# Patient Record
Sex: MALE | Race: WHITE | Employment: FULL TIME | URBAN - METROPOLITAN AREA
[De-identification: names, ages, dates, MRNs, and addresses within clinical notes are randomized per-mention and may not be internally consistent; named-entity substitution may affect disease eponyms.]

---

## 2017-01-27 ENCOUNTER — OFFICE VISIT (OUTPATIENT)
Dept: FAMILY MEDICINE | Facility: CLINIC | Age: 32
End: 2017-01-27
Payer: COMMERCIAL

## 2017-01-27 VITALS
HEART RATE: 76 BPM | TEMPERATURE: 98.5 F | HEIGHT: 74 IN | WEIGHT: 233 LBS | SYSTOLIC BLOOD PRESSURE: 116 MMHG | BODY MASS INDEX: 29.9 KG/M2 | DIASTOLIC BLOOD PRESSURE: 72 MMHG

## 2017-01-27 DIAGNOSIS — Z23 NEED FOR PROPHYLACTIC VACCINATION AND INOCULATION AGAINST INFLUENZA: ICD-10-CM

## 2017-01-27 DIAGNOSIS — F43.22 ADJUSTMENT DISORDER WITH ANXIOUS MOOD: Primary | ICD-10-CM

## 2017-01-27 DIAGNOSIS — F41.0 PANIC ATTACK: ICD-10-CM

## 2017-01-27 PROCEDURE — 90686 IIV4 VACC NO PRSV 0.5 ML IM: CPT | Performed by: PHYSICIAN ASSISTANT

## 2017-01-27 PROCEDURE — 99203 OFFICE O/P NEW LOW 30 MIN: CPT | Mod: 25 | Performed by: PHYSICIAN ASSISTANT

## 2017-01-27 PROCEDURE — 90471 IMMUNIZATION ADMIN: CPT | Performed by: PHYSICIAN ASSISTANT

## 2017-01-27 RX ORDER — ALPRAZOLAM 0.5 MG
TABLET ORAL
Qty: 10 TABLET | Refills: 0 | Status: SHIPPED | OUTPATIENT
Start: 2017-01-27 | End: 2018-11-15

## 2017-01-27 RX ORDER — ESCITALOPRAM OXALATE 10 MG/1
10 TABLET ORAL DAILY
Qty: 30 TABLET | Refills: 1 | Status: SHIPPED | OUTPATIENT
Start: 2017-01-27 | End: 2017-03-03

## 2017-01-27 ASSESSMENT — ANXIETY QUESTIONNAIRES
6. BECOMING EASILY ANNOYED OR IRRITABLE: MORE THAN HALF THE DAYS
2. NOT BEING ABLE TO STOP OR CONTROL WORRYING: SEVERAL DAYS
GAD7 TOTAL SCORE: 10
7. FEELING AFRAID AS IF SOMETHING AWFUL MIGHT HAPPEN: MORE THAN HALF THE DAYS
1. FEELING NERVOUS, ANXIOUS, OR ON EDGE: SEVERAL DAYS
3. WORRYING TOO MUCH ABOUT DIFFERENT THINGS: MORE THAN HALF THE DAYS
5. BEING SO RESTLESS THAT IT IS HARD TO SIT STILL: NOT AT ALL

## 2017-01-27 ASSESSMENT — PATIENT HEALTH QUESTIONNAIRE - PHQ9: 5. POOR APPETITE OR OVEREATING: MORE THAN HALF THE DAYS

## 2017-01-27 NOTE — PROGRESS NOTES
Injectable Influenza Immunization Documentation    1.  Is the person to be vaccinated sick today? Yes    2. Does the person to be vaccinated have an allergy to eggs or to a component of the vaccine?  No    3. Has the person to be vaccinated today ever had a serious reaction to influenza vaccine in the past?  No    4. Has the person to be vaccinated ever had Guillain-[place] syndrome?  No     Form completed by Chely Win, Certified Medical Assistant (AAMA)

## 2017-01-27 NOTE — PROGRESS NOTES
SUBJECTIVE:                                                    Rex Cummins is a 32 year old male who presents to clinic today for the following health issues:      Abnormal Mood Symptoms     Onset: A few months     Description:   Depression: YES  Anxiety: YES    Accompanying Signs & Symptoms:  Still participating in activities that you used to enjoy: YES- but hasn't been fishing as much as normal, also had to leave a [employer] convention due to anxiety.  Fatigue: not sure, has an infant with sleeping issues.  Does have a hard time falling asleep.  Irritability: YES  Difficulty concentrating: YES- depends on situation, hard time at home.  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 Drinking a bit more, though he's cut down on that. Admits that new child is making this happen. He is doing well but overwhelmed with the changes. His wife also has struggled with depressive type symptoms recently and their relationship has been strained. He's been trying to get a bit more exercise and make some personal changes. He did have 2 episodes of panic attack type symptoms recently, the most recent at a [employer] show where he became panicked and needed to leave, had chest pain and severe anxiety which he says resolved when he left.    He has some low mood issues but those are more related he thinks to and secondary to his anxiety.     Patient Active Problem List   Diagnosis     Tobacco use disorder, continuous     CARDIOVASCULAR  "SCREENING; LDL GOAL LESS THAN 160      Current Outpatient Prescriptions   Medication     ALPRAZolam (XANAX) 0.5 MG tablet     escitalopram (LEXAPRO) 10 MG tablet     No current facility-administered medications for this visit.        Problem list and histories reviewed & adjusted, as indicated.  Additional history: as documented    Problem list, Medication list, Allergies, and Medical/Social/Surgical histories reviewed in [facility] and updated as appropriate.    ROS:  Constitutional, HEENT, cardiovascular, pulmonary, gi and gu systems are negative, except as otherwise noted.    OBJECTIVE:                                                    /72 mmHg  Pulse 76  Temp(Src) 98.5  F (36.9  C) (Oral)  Ht 6' 2\" (1.88 m)  Wt 233 lb (105.688 kg)  BMI 29.90 kg/m2  Body mass index is 29.9 kg/(m^2).  GENERAL: healthy, alert and no distress  Psych: Appropriate appearance.  Alert and oriented times 3; coherent speech, normal   rate and volume, able to articulate logical thoughts, able   to abstract reason, no tangential thoughts, no hallucinations   or delusions.  Normal behavior.  His affect is bright.           ASSESSMENT/PLAN:                                                        ICD-10-CM    1. Adjustment disorder with anxious mood F43.22 MENTAL HEALTH REFERRAL     escitalopram (LEXAPRO) 10 MG tablet   2. Panic attack F41.0 ALPRAZolam (XANAX) 0.5 MG tablet     escitalopram (LEXAPRO) 10 MG tablet   3. Need for prophylactic vaccination and inoculation against influenza Z23 FLU VAC, SPLIT VIRUS IM > 3 YO (QUADRIVALENT) [93400]     Vaccine Administration, Initial [76927]      Reviewed options. Recommend therapy. Recommend exercise, self cares, meditation. Will refer for therapy. Start Lexapro. Okay to have Alprazolam around for panic attacks if used rarely. This prescription is given with a discussion of side effects, risks and proper use.  Instructions are given to follow up if not improving or symptoms change or worsen as " discussed.     25 min visit over 50% counseling.  Follow up in 3 weeks phone visit, sooner if issues.     NATHALIE Haines, PA-C

## 2017-01-27 NOTE — PATIENT INSTRUCTIONS
To Consider seeing therapist Mikael Matias - Call: [phone]  Exercise whenever you can get it is always good   Takes 3-4 weeks for Lexapro to start working     [facility]   Discharged by : Chely BENTLEY Certified Medical Assistant (AAMA)   Paper scripts provided to patient : 1   If you have any questions regarding to your visit please contact your care team:   [facility] Hours Telephone Number   DEEP Jeffers Dr., PA-C    7am-7pm Monday - Thursday   7am-5pm Fridays  [phone]   (Appointment scheduling available 24/7)   RN Line   [phone] option 2       What options do I have for visits at the clinic other than the traditional office visit?   To expand how we care for you, many of our providers are utilizing electronic visits (e-visits) and telephone visits, when medically appropriate, for interactions with their patients rather than a visit in the clinic. We also offer nurse visits for many medical concerns. Just like any other service, we will bill your insurance company for this type of visit based on time spent on the phone with your provider. Not all insurance companies cover these visits. Please check with your medical insurance if this type of visit is covered. You will be responsible for any charges that are not paid by your insurance.     E-visits via [employer]: generally incur a $35.00 fee.     Telephone visits:   Time spent on the phone: *charged based on time that is spent on the phone in increments of 10 minutes. Estimated cost:   5-10 mins $30.00   11-20 mins. $59.00   21-30 mins. $85.00   Use [employer] (secure email communication and access to your chart) to send your primary care provider a message or make an appointment. Ask someone on your Team how to sign up for [employer].   For a Price Quote for your services, please call our Consumer Price Line at [phone].   As always, Thank you for trusting us with your health  care needs!                [facility]:    Scheduling Appointments  [facility]  Call: [phone]    [place] [facility]  Call: [phone]    [facility]  Call: [phone]

## 2017-01-27 NOTE — NURSING NOTE
"Chief Complaint   Patient presents with     Anxiety     Mental Health Problem     Flu Shot       Initial /72 mmHg  Pulse 76  Temp(Src) 98.5  F (36.9  C) (Oral)  Ht 6' 2\" (1.88 m)  Wt 233 lb (105.688 kg)  BMI 29.90 kg/m2 Estimated body mass index is 29.9 kg/(m^2) as calculated from the following:    Height as of this encounter: 6' 2\" (1.88 m).    Weight as of this encounter: 233 lb (105.688 kg).  BP completed using cuff size: annette Win, Certified Medical Assistant (AAMA)     "

## 2017-01-27 NOTE — MR AVS SNAPSHOT
After Visit Summary   1/27/2017    Rex Cummins    MRN: [id number]           Patient Information     Date Of Birth          1985        Visit Information        Provider Department      1/27/2017 2:00 PM Ham García PA-C [facility]        Today's Diagnoses     Adjustment disorder with anxious mood    -  1     Panic attack         Need for prophylactic vaccination and inoculation against influenza           Care Instructions    To Consider seeing therapist Mikael Matias - Call: [phone]  Exercise whenever you can get it is always good   Takes 3-4 weeks for Lexapro to start working     [facility]   Discharged by : Chely BENTLEY Certified Medical Assistant (AAMA)   Paper scripts provided to patient : 1   If you have any questions regarding to your visit please contact your care team:   [facility] Hours Telephone Number   DEEP Jeffers Dr., PA-C    7am-7pm Monday - Thursday   7am-5pm Fridays  [phone]   (Appointment scheduling available 24/7)   RN Line   [phone] option 2       What options do I have for visits at the clinic other than the traditional office visit?   To expand how we care for you, many of our providers are utilizing electronic visits (e-visits) and telephone visits, when medically appropriate, for interactions with their patients rather than a visit in the clinic. We also offer nurse visits for many medical concerns. Just like any other service, we will bill your insurance company for this type of visit based on time spent on the phone with your provider. Not all insurance companies cover these visits. Please check with your medical insurance if this type of visit is covered. You will be responsible for any charges that are not paid by your insurance.     E-visits via [employer]: generally incur a $35.00 fee.     Telephone visits:   Time spent on the phone: *charged based  on time that is spent on the phone in increments of 10 minutes. Estimated cost:   5-10 mins $30.00   11-20 mins. $59.00   21-30 mins. $85.00   Use [employer] (secure email communication and access to your chart) to send your primary care provider a message or make an appointment. Ask someone on your Team how to sign up for [employer].   For a Price Quote for your services, please call our [place] Price Line at [phone].   As always, Thank you for trusting us with your health care needs!                [facility]:    Scheduling Appointments  [facility]  Call: [phone]    [facility]  Call: [phone]    [facility]  Call: [phone]                  Follow-ups after your visit        Additional Services     MENTAL HEALTH REFERRAL       Your provider has referred you to: [facility]: [facility] - Counseling (Individual/Couples/Family) - Pt to schedule with Mikael Matias [facility]     All scheduling is subject to the client's specific insurance plan & benefits, provider/location availability, and provider clinical specialities.  Please arrive 15 minutes early for your first appointment and bring your completed paperwork.    Please be aware that coverage of these services is subject to the terms and limitations of your health insurance plan.  Call member services at your health plan with any benefit or coverage questions.                  Who to contact     If you have questions or need follow up information about today's clinic visit or your schedule please contact [facility] directly at [phone].  Normal or non-critical lab and imaging results will be communicated to you by MyChart, letter or phone within 4 business days after the clinic has received the results. If you do not hear from us within 7 days, please contact the clinic through MyChart or phone. If you have a critical or  "abnormal lab result, we will notify you by phone as soon as possible.  Submit refill requests through [employer] or call your pharmacy and they will forward the refill request to us. Please allow 3 business days for your refill to be completed.          Additional Information About Your Visit        [email or web address] Information     Next Points lets you send messages to your doctor, view your test results, renew your prescriptions, schedule appointments and more. To sign up, go to [email or web address] . Click on \"Log in\" on the left side of the screen, which will take you to the Welcome page. Then click on \"Sign up Now\" on the right side of the page.     You will be asked to enter the access code listed below, as well as some personal information. Please follow the directions to create your username and password.     Your access code is: [email or web address]  Expires: 2017  2:57 PM     Your access code will  in 90 days. If you need help or a new code, please call your [place] clinic or [phone].        Care EveryWhere ID     This is your Care EveryWhere ID. This could be used by other organizations to access your [place] medical records  [id number]        Your Vitals Were     Pulse Temperature Height BMI (Body Mass Index)          76 98.5  F (36.9  C) (Oral) 6' 2\" (1.88 m) 29.90 kg/m2         Blood Pressure from Last 3 Encounters:   17 116/72   11 131/73   03/08/10 126/73    Weight from Last 3 Encounters:   17 233 lb (105.688 kg)   11 209 lb 9.6 oz (95.074 kg)   03/08/10 217 lb (98.431 kg)              We Performed the Following     FLU VAC, SPLIT VIRUS IM > 3 YO (QUADRIVALENT) [91664]     MENTAL HEALTH REFERRAL     Vaccine Administration, Initial [46124]          Today's Medication Changes          These changes are accurate as of: 17  2:57 PM.  If you have any questions, ask your nurse or doctor.               Start taking these medicines.        Dose/Directions    ALPRAZolam 0.5 MG " tablet   Commonly known as:  XANAX   Used for:  Panic attack   Started by:  Ham García PA-C        Take 1 or 2 tablets as needed for panic attack   Quantity:  10 tablet   Refills:  0       escitalopram 10 MG tablet   Commonly known as:  LEXAPRO   Used for:  Adjustment disorder with anxious mood, Panic attack   Started by:  Ham García PA-C        Dose:  10 mg   Take 1 tablet (10 mg) by mouth daily   Quantity:  30 tablet   Refills:  1            Where to get your medicines      These medications were sent to [facility] [address] [place], [place] - [address] [facility]  [address] [facility] [address]     Phone:  [phone]    - escitalopram 10 MG tablet      Some of these will need a paper prescription and others can be bought over the counter.  Ask your nurse if you have questions.     Bring a paper prescription for each of these medications    - ALPRAZolam 0.5 MG tablet             Primary Care Provider Office Phone # Fax #    Torie Hines -[phone]       [address] [address]        Thank you!     Thank you for choosing [facility]  for your care. Our goal is always to provide you with excellent care. Hearing back from our patients is one way we can continue to improve our services. Please take a few minutes to complete the written survey that you may receive in the mail after your visit with us. Thank you!             Your Updated Medication List - Protect others around you: Learn how to safely use, store and throw away your medicines at www.disposemymeds.org.          This list is accurate as of: 1/27/17  2:57 PM.  Always use your most recent med list.                   Brand Name Dispense Instructions for use    ALPRAZolam 0.5 MG tablet    XANAX    10 tablet    Take 1 or 2 tablets as needed for panic attack       escitalopram 10 MG tablet    LEXAPRO    30 tablet    Take 1 tablet (10 mg) by mouth  daily

## 2017-01-28 ASSESSMENT — ANXIETY QUESTIONNAIRES: GAD7 TOTAL SCORE: 10

## 2017-01-28 ASSESSMENT — PATIENT HEALTH QUESTIONNAIRE - PHQ9: SUM OF ALL RESPONSES TO PHQ QUESTIONS 1-9: 10

## 2017-03-03 ENCOUNTER — OFFICE VISIT (OUTPATIENT)
Dept: FAMILY MEDICINE | Facility: CLINIC | Age: 32
End: 2017-03-03
Payer: COMMERCIAL

## 2017-03-03 VITALS
WEIGHT: 227.2 LBS | SYSTOLIC BLOOD PRESSURE: 112 MMHG | BODY MASS INDEX: 29.17 KG/M2 | TEMPERATURE: 98.4 F | DIASTOLIC BLOOD PRESSURE: 64 MMHG | HEART RATE: 80 BPM

## 2017-03-03 DIAGNOSIS — F41.0 PANIC ATTACK: ICD-10-CM

## 2017-03-03 DIAGNOSIS — F43.22 ADJUSTMENT DISORDER WITH ANXIOUS MOOD: ICD-10-CM

## 2017-03-03 DIAGNOSIS — F41.1 GAD (GENERALIZED ANXIETY DISORDER): Primary | ICD-10-CM

## 2017-03-03 PROCEDURE — 99213 OFFICE O/P EST LOW 20 MIN: CPT | Performed by: PHYSICIAN ASSISTANT

## 2017-03-03 RX ORDER — ESCITALOPRAM OXALATE 10 MG/1
TABLET ORAL
Qty: 90 TABLET | Refills: 1 | Status: SHIPPED | OUTPATIENT
Start: 2017-03-03 | End: 2017-08-09 | Stop reason: DRUGHIGH

## 2017-03-03 ASSESSMENT — ANXIETY QUESTIONNAIRES
5. BEING SO RESTLESS THAT IT IS HARD TO SIT STILL: SEVERAL DAYS
3. WORRYING TOO MUCH ABOUT DIFFERENT THINGS: NOT AT ALL
GAD7 TOTAL SCORE: 2
6. BECOMING EASILY ANNOYED OR IRRITABLE: NOT AT ALL
2. NOT BEING ABLE TO STOP OR CONTROL WORRYING: NOT AT ALL
7. FEELING AFRAID AS IF SOMETHING AWFUL MIGHT HAPPEN: NOT AT ALL
1. FEELING NERVOUS, ANXIOUS, OR ON EDGE: SEVERAL DAYS

## 2017-03-03 ASSESSMENT — PATIENT HEALTH QUESTIONNAIRE - PHQ9: 5. POOR APPETITE OR OVEREATING: NOT AT ALL

## 2017-03-03 NOTE — NURSING NOTE
"Chief Complaint   Patient presents with     Anxiety     Recheck       Initial /64  Pulse 80  Temp 98.4  F (36.9  C) (Oral)  Wt 227 lb 3.2 oz (103.1 kg)  BMI 29.17 kg/m2 Estimated body mass index is 29.17 kg/(m^2) as calculated from the following:    Height as of 1/27/17: 6' 2\" (1.88 m).    Weight as of this encounter: 227 lb 3.2 oz (103.1 kg).  Medication Reconciliation: complete     Hope SHANTAL Persaud      "

## 2017-03-03 NOTE — PROGRESS NOTES
SUBJECTIVE:                                                    Rex Cummins is a 32 year old male who presents to clinic today for the following health issues:      Anxiety Follow-Up    Status since last visit: Stable    Other associated symptoms:None    Complicating factors:   Significant life event: No   Current substance abuse: None  Depression symptoms: No  VALERIA-7 SCORE 1/27/2017   Total Score 10        GAD7     Doing well. Lexapro is well tolerated since starting and has improved mood / anxiety, etc. He denies any concerns of side effects or major panic attacks currently.    Problem list and histories reviewed & adjusted, as indicated.  Additional history: as documented    Labs reviewed in EPIC    Reviewed and updated as needed this visit by clinical staff  Tobacco  Allergies  Med Hx  Surg Hx  Fam Hx  Soc Hx      Reviewed and updated as needed this visit by Provider         ROS:  Constitutional, HEENT, cardiovascular, pulmonary, gi and gu systems are negative, except as otherwise noted.    OBJECTIVE:                                                    /64  Pulse 80  Temp 98.4  F (36.9  C) (Oral)  Wt 227 lb 3.2 oz (103.1 kg)  BMI 29.17 kg/m2  Body mass index is 29.17 kg/(m^2).  GENERAL: healthy, alert and no distress  Psych: Appropriate appearance.  Alert and oriented times 3; coherent speech, normal   rate and volume, able to articulate logical thoughts, able   to abstract reason, no tangential thoughts, no hallucinations   or delusions.  Normal behavior.  His affect is bright.         ASSESSMENT/PLAN:                                                      (F41.1) VALERIA (generalized anxiety disorder)  (primary encounter diagnosis)  Comment:   Plan: Generalized anxiety - stable. Recommend to continue the Lexapro. This prescription is given with a discussion of side effects, risks and proper use.  Instructions are given to follow up if not improving or symptoms change or worsen as discussed.      (F43.22) Adjustment disorder with anxious mood  Comment:   Plan: escitalopram (LEXAPRO) 10 MG tablet        Improving     (F41.0) Panic attack  Comment:   Plan: escitalopram (LEXAPRO) 10 MG tablet        Improving     Follow up in 3-6 months, sooner if issues.     JOSE LAURENT PA-C  [facility]

## 2017-03-03 NOTE — MR AVS SNAPSHOT
"              After Visit Summary   3/3/2017    Rex Cummins    MRN: [id number]           Patient Information     Date Of Birth          1985        Visit Information        Provider Department      3/3/2017 2:40 PM Ham García PA-C [facility]        Today's Diagnoses     VALERIA (generalized anxiety disorder)    -  1    Adjustment disorder with anxious mood        Panic attack           Follow-ups after your visit        Your next 10 appointments already scheduled     Mar 27, 2017  1:30 PM CDT   New Visit with ARI Cleary   [facility]    [address] [facility] [address] [facility] [address] [phone]              Who to contact     If you have questions or need follow up information about today's clinic visit or your schedule please contact [facility] directly at [phone].  Normal or non-critical lab and imaging results will be communicated to you by MyChart, letter or phone within 4 business days after the clinic has received the results. If you do not hear from us within 7 days, please contact the clinic through MyChart or phone. If you have a critical or abnormal lab result, we will notify you by phone as soon as possible.  Submit refill requests through [employer] or call your pharmacy and they will forward the refill request to us. Please allow 3 business days for your refill to be completed.          Additional Information About Your Visit        MyChart Information     [employer] lets you send messages to your doctor, view your test results, renew your prescriptions, schedule appointments and more. To sign up, go to [email or web address] . Click on \"Log in\" on the left side of the screen, which will take you to the Welcome page. Then click on \"Sign up Now\" on the right side of the page.     You will be asked to enter the access code listed below, as well as some personal information. " Please follow the directions to create your username and password.     Your access code is: [email or web address]  Expires: 2017  2:57 PM     Your access code will  in 90 days. If you need help or a new code, please call your [place] clinic or [phone].        Care EveryWhere ID     This is your Care EveryWhere ID. This could be used by other organizations to access your [place] medical records  [id number]        Your Vitals Were     Pulse Temperature BMI (Body Mass Index)             80 98.4  F (36.9  C) (Oral) 29.17 kg/m2          Blood Pressure from Last 3 Encounters:   17 112/64   17 116/72   11 131/73    Weight from Last 3 Encounters:   17 227 lb 3.2 oz (103.1 kg)   17 233 lb (105.7 kg)   11 209 lb 9.6 oz (95.1 kg)              Today, you had the following     No orders found for display         Today's Medication Changes          These changes are accurate as of: 3/3/17  3:08 PM.  If you have any questions, ask your nurse or doctor.               These medicines have changed or have updated prescriptions.        Dose/Directions    escitalopram 10 MG tablet   Commonly known as:  LEXAPRO   This may have changed:    - how much to take  - how to take this  - when to take this  - additional instructions   Used for:  Adjustment disorder with anxious mood, Panic attack   Changed by:  Ham García PA-C        1 tablet daily (Profile med - please keep on file for patient to call)   Quantity:  90 tablet   Refills:  1            Where to get your medicines      These medications were sent to [facility] [address] [facility] [address] [place] [address]  [address] [facility] [address]     Phone:  [phone]     escitalopram 10 MG tablet                Primary Care Provider Office Phone # Fax #    Torie Hines [phone]       [address] [address]        Thank you!     Thank you for choosing  [facility]  for your care. Our goal is always to provide you with excellent care. Hearing back from our patients is one way we can continue to improve our services. Please take a few minutes to complete the written survey that you may receive in the mail after your visit with us. Thank you!             Your Updated Medication List - Protect others around you: Learn how to safely use, store and throw away your medicines at www.disposemymeds.org.          This list is accurate as of: 3/3/17  3:08 PM.  Always use your most recent med list.                   Brand Name Dispense Instructions for use    ALPRAZolam 0.5 MG tablet    XANAX    10 tablet    Take 1 or 2 tablets as needed for panic attack       escitalopram 10 MG tablet    LEXAPRO    90 tablet    1 tablet daily (Profile med - please keep on file for patient to call)

## 2017-03-04 ASSESSMENT — PATIENT HEALTH QUESTIONNAIRE - PHQ9: SUM OF ALL RESPONSES TO PHQ QUESTIONS 1-9: 2

## 2017-03-04 ASSESSMENT — ANXIETY QUESTIONNAIRES: GAD7 TOTAL SCORE: 2

## 2017-08-02 ENCOUNTER — MYC MEDICAL ADVICE (OUTPATIENT)
Dept: FAMILY MEDICINE | Facility: CLINIC | Age: 32
End: 2017-08-02

## 2017-08-02 DIAGNOSIS — F41.1 GAD (GENERALIZED ANXIETY DISORDER): Primary | ICD-10-CM

## 2017-08-02 DIAGNOSIS — F41.0 PANIC ATTACK: ICD-10-CM

## 2017-08-03 PROBLEM — F41.1 GAD (GENERALIZED ANXIETY DISORDER): Status: ACTIVE | Noted: 2017-08-03

## 2017-08-03 RX ORDER — ESCITALOPRAM OXALATE 20 MG/1
20 TABLET ORAL DAILY
Qty: 90 TABLET | Refills: 0 | Status: SHIPPED | OUTPATIENT
Start: 2017-08-03 | End: 2017-11-06

## 2017-11-06 DIAGNOSIS — F41.0 PANIC ATTACK: ICD-10-CM

## 2017-11-06 DIAGNOSIS — F41.1 GAD (GENERALIZED ANXIETY DISORDER): ICD-10-CM

## 2017-11-06 NOTE — LETTER
[address] [address] [phone]    November 9, 2017      Rex Cummins  [address] [place] [id number]      Dear Rex,    We recently received a call from your pharmacy requesting a refill of your medication, Lexapro/escitalopram.     A review of your chart indicates that an appointment is required with your provider.  Please call the clinic at [phone] to schedule your appointment.    We have authorized one refill of your medication to allow time for you to schedule.   If you have a history of diabetes or high cholesterol, please come in fasting for the appointment. Fasting entails nothing to eat or drink 8 hours prior to your appointment; with the exception on water. You may take your medication the day of the appointment.    Thank you,      Patient Care staff for Ham García PA-C

## 2017-11-09 RX ORDER — ESCITALOPRAM OXALATE 20 MG/1
TABLET ORAL
Qty: 30 TABLET | Refills: 0 | Status: SHIPPED | OUTPATIENT
Start: 2017-11-09 | End: 2018-11-15

## 2018-11-15 ENCOUNTER — OFFICE VISIT (OUTPATIENT)
Dept: FAMILY MEDICINE | Facility: CLINIC | Age: 33
End: 2018-11-15
Payer: COMMERCIAL

## 2018-11-15 VITALS
HEIGHT: 74 IN | SYSTOLIC BLOOD PRESSURE: 124 MMHG | DIASTOLIC BLOOD PRESSURE: 76 MMHG | BODY MASS INDEX: 31.95 KG/M2 | HEART RATE: 68 BPM | WEIGHT: 249 LBS | TEMPERATURE: 98 F

## 2018-11-15 DIAGNOSIS — Z23 NEED FOR PROPHYLACTIC VACCINATION AND INOCULATION AGAINST INFLUENZA: ICD-10-CM

## 2018-11-15 DIAGNOSIS — Z23 NEED FOR PROPHYLACTIC VACCINATION WITH TETANUS-DIPHTHERIA (TD): ICD-10-CM

## 2018-11-15 DIAGNOSIS — Z23 FLU VACCINE NEED: ICD-10-CM

## 2018-11-15 DIAGNOSIS — Z00.00 ROUTINE GENERAL MEDICAL EXAMINATION AT A HEALTH CARE FACILITY: Primary | ICD-10-CM

## 2018-11-15 PROBLEM — F41.1 GAD (GENERALIZED ANXIETY DISORDER): Status: RESOLVED | Noted: 2017-08-03 | Resolved: 2018-11-15

## 2018-11-15 PROBLEM — Z86.59 HISTORY OF ANXIETY: Status: ACTIVE | Noted: 2018-11-15

## 2018-11-15 PROCEDURE — 36415 COLL VENOUS BLD VENIPUNCTURE: CPT | Performed by: PHYSICIAN ASSISTANT

## 2018-11-15 PROCEDURE — 99395 PREV VISIT EST AGE 18-39: CPT | Mod: 25 | Performed by: PHYSICIAN ASSISTANT

## 2018-11-15 PROCEDURE — 90686 IIV4 VACC NO PRSV 0.5 ML IM: CPT | Performed by: PHYSICIAN ASSISTANT

## 2018-11-15 PROCEDURE — 82947 ASSAY GLUCOSE BLOOD QUANT: CPT | Performed by: PHYSICIAN ASSISTANT

## 2018-11-15 PROCEDURE — 90471 IMMUNIZATION ADMIN: CPT | Performed by: PHYSICIAN ASSISTANT

## 2018-11-15 PROCEDURE — 80061 LIPID PANEL: CPT | Performed by: PHYSICIAN ASSISTANT

## 2018-11-15 ASSESSMENT — ENCOUNTER SYMPTOMS
NERVOUS/ANXIOUS: 0
NAUSEA: 0
DIARRHEA: 0
WEAKNESS: 0
COUGH: 0
CONSTIPATION: 0
HEMATOCHEZIA: 0
PARESTHESIAS: 0
DIZZINESS: 0
HEARTBURN: 0
HEADACHES: 0
MYALGIAS: 0
ABDOMINAL PAIN: 0
ARTHRALGIAS: 0
FREQUENCY: 0
HEMATURIA: 0
JOINT SWELLING: 0
PALPITATIONS: 0
CHILLS: 0
EYE PAIN: 0
DYSURIA: 0
SORE THROAT: 0
SHORTNESS OF BREATH: 0
FEVER: 0

## 2018-11-15 NOTE — PROGRESS NOTES

## 2018-11-15 NOTE — PROGRESS NOTES
SUBJECTIVE:   CC: Rex Cummins is an 33 year old male who presents for preventative health visit.     Physical   Annual:     Getting at least 3 servings of Calcium per day:  NO    Bi-annual eye exam:  Yes    Dental care twice a year:  NO    Sleep apnea or symptoms of sleep apnea:  Daytime drowsiness    Diet:  Regular (no restrictions) and Breakfast skipped    Frequency of exercise:  1 day/week    Duration of exercise:  15-30 minutes    Taking medications regularly:  Yes    Medication side effects:  Not applicable    Additional concerns today:  No      Today's PHQ-2 Score:   PHQ-2 ( 1999 Pfizer) 11/15/2018   Q1: Little interest or pleasure in doing things 0   Q2: Feeling down, depressed or hopeless 0   PHQ-2 Score 0   Q1: Little interest or pleasure in doing things Not at all   Q2: Feeling down, depressed or hopeless Not at all   PHQ-2 Score 0       Abuse: Current or Past(Physical, Sexual or Emotional)- No  Do you feel safe in your environment - Yes    Social History   Substance Use Topics     Smoking status: Former Smoker     Types: Cigarettes, Dip, chew, snus or snuff     Smokeless tobacco: Never Used     Alcohol use Yes      Comment: Cutting down - socially, a few drinks here and there      Alcohol Use 11/15/2018   If you drink alcohol do you typically have greater than 3 drinks per day OR greater than 7 drinks per week? No   No flowsheet data found.    Last PSA: No results found for: PSA    Reviewed orders with patient. Reviewed health maintenance and updated orders accordingly - Yes  Labs reviewed in EPIC    Reviewed and updated as needed this visit by clinical staff  Tobacco  Allergies  Meds  Med Hx  Surg Hx  Fam Hx  Soc Hx        Reviewed and updated as needed this visit by Provider  Allergies            Review of Systems   Constitutional: Negative for chills and fever.   HENT: Negative for congestion, ear pain, hearing loss and sore throat.    Eyes: Negative for pain and visual disturbance.  "  Respiratory: Negative for cough and shortness of breath.    Cardiovascular: Negative for chest pain, palpitations and peripheral edema.   Gastrointestinal: Negative for abdominal pain, constipation, diarrhea, heartburn, hematochezia and nausea.   Genitourinary: Negative for discharge, dysuria, frequency, genital sores, hematuria, impotence and urgency.   Musculoskeletal: Negative for arthralgias, joint swelling and myalgias.   Skin: Negative for rash.   Neurological: Negative for dizziness, weakness, headaches and paresthesias.   Psychiatric/Behavioral: Negative for mood changes. The patient is not nervous/anxious.      OBJECTIVE:   /76 (Cuff Size: Adult Large)  Pulse 68  Temp 98  F (36.7  C) (Oral)  Ht 6' 2\" (1.88 m)  Wt 249 lb (112.9 kg)  BMI 31.97 kg/m2    Physical Exam  GENERAL: healthy, alert and no distress  EYES: Eyes grossly normal to inspection, PERRL and conjunctivae and sclerae normal  HENT: ear canals and TM's normal, nose and mouth without ulcers or lesions  NECK: no adenopathy, no asymmetry, masses, or scars and thyroid normal to palpation  RESP: lungs clear to auscultation - no rales, rhonchi or wheezes  CV: regular rate and rhythm, normal S1 S2, no S3 or S4, no murmur, click or rub, no peripheral edema and peripheral pulses strong  ABDOMEN: soft, nontender, no hepatosplenomegaly, no masses and bowel sounds normal  MS: no gross musculoskeletal defects noted, no edema  SKIN: no suspicious lesions or rashes  NEURO: Normal strength and tone, mentation intact and speech normal  PSYCH: mentation appears normal, affect normal/bright  LYMPH: no cervical, supraclavicular, axillary, or inguinal adenopathy    Diagnostic Test Results:  none     ASSESSMENT/PLAN:   (Z00.00) Routine general medical examination at a health care facility  (primary encounter diagnosis)  Comment: Well person   Plan: Lipid panel reflex to direct LDL Non-fasting,         Glucose        Diet, exercise, wellness and other " "preventive recommendations related to health maintenance were discussed.  Follow up as needed for acute issues.  Physical exam in 1 year.     (Z23) Need for prophylactic vaccination with tetanus-diphtheria (Td)  Comment:   Plan: Is considering     (Z23) Flu vaccine need  Comment:   Plan:     (Z23) Need for prophylactic vaccination and inoculation against influenza  Comment:   Plan: Vaccine Administration, Initial [85021], FLU         VACCINE, SPLIT VIRUS, IM (QUADRIVALENT)         [61641]- >3 YRS        Given.       COUNSELING:   Reviewed preventive health counseling, as reflected in patient instructions    BP Readings from Last 1 Encounters:   11/15/18 124/76     Estimated body mass index is 31.97 kg/(m^2) as calculated from the following:    Height as of this encounter: 6' 2\" (1.88 m).    Weight as of this encounter: 249 lb (112.9 kg).     reports that he has quit smoking. His smoking use included Cigarettes and Dip, chew, snus or snuff. He has never used smokeless tobacco.      Counseling Resources:  ATP IV Guidelines  Pooled Cohorts Equation Calculator  FRAX Risk Assessment  ICSI Preventive Guidelines  Dietary Guidelines for Americans, 2010  USDA's MyPlate  ASA Prophylaxis  Lung CA Screening    JOSE LAURENT PA-C  [facility]  Answers for HPI/ROS submitted by the patient on 11/15/2018   PHQ-2 Score: 0    "

## 2018-11-15 NOTE — MR AVS SNAPSHOT
After Visit Summary   11/15/2018    Rex Cummins    MRN: [id number]           Patient Information     Date Of Birth          1985        Visit Information        Provider Department      11/15/2018 2:20 PM Ham García PA-C [facility]        Today's Diagnoses     Routine general medical examination at a health care facility    -  1    Need for prophylactic vaccination with tetanus-diphtheria (Td)        Flu vaccine need          Care Instructions      Preventive Health Recommendations  Male Ages 26 - 39    Yearly exam:             See your health care provider every year in order to  o   Review health changes.   o   Discuss preventive care.    o   Review your medicines if your doctor has prescribed any.    You should be tested each year for STDs (sexually transmitted diseases), if you re at risk.     After age 35, talk to your provider about cholesterol testing. If you are at risk for heart disease, have your cholesterol tested at least every 5 years.     If you are at risk for diabetes, you should have a diabetes test (fasting glucose).  Shots: Get a flu shot each year. Get a tetanus shot every 10 years.     Nutrition:    Eat at least 5 servings of fruits and vegetables daily.     Eat whole-grain bread, whole-wheat pasta and brown rice instead of white grains and rice.     Get adequate Calcium and Vitamin D.     Lifestyle    Exercise for at least 150 minutes a week (30 minutes a day, 5 days a week). This will help you control your weight and prevent disease.     Limit alcohol to one drink per day.     No smoking.     Wear sunscreen to prevent skin cancer.     See your dentist every six months for an exam and cleaning.             Follow-ups after your visit        Who to contact     If you have questions or need follow up information about today's clinic visit or your schedule please contact [facility] directly at [phone].  Normal or  "non-critical lab and imaging results will be communicated to you by MyChart, letter or phone within 4 business days after the clinic has received the results. If you do not hear from us within 7 days, please contact the clinic through [employer] or phone. If you have a critical or abnormal lab result, we will notify you by phone as soon as possible.  Submit refill requests through [employer] or call your pharmacy and they will forward the refill request to us. Please allow 3 business days for your refill to be completed.          Additional Information About Your Visit        [employer] Information     [employer] gives you secure access to your electronic health record. If you see a primary care provider, you can also send messages to your care team and make appointments. If you have questions, please call your primary care clinic.  If you do not have a primary care provider, please call [phone] and they will assist you.        Care EveryWhere ID     This is your Care EveryWhere ID. This could be used by other organizations to access your [place] medical records  [id number]        Your Vitals Were     Pulse Temperature Height BMI (Body Mass Index)          68 98  F (36.7  C) (Oral) 6' 2\" (1.88 m) 31.97 kg/m2         Blood Pressure from Last 3 Encounters:   11/15/18 124/76   03/03/17 112/64   01/27/17 116/72    Weight from Last 3 Encounters:   11/15/18 249 lb (112.9 kg)   03/03/17 227 lb 3.2 oz (103.1 kg)   01/27/17 233 lb (105.7 kg)              We Performed the Following     Glucose     Lipid panel reflex to direct LDL Non-fasting          Today's Medication Changes          These changes are accurate as of 11/15/18  2:51 PM.  If you have any questions, ask your nurse or doctor.               Stop taking these medicines if you haven't already. Please contact your care team if you have questions.     ALPRAZolam 0.5 MG tablet   Commonly known as:  XANAX   Stopped by:  Ham García PA-C           escitalopram 20 " MG tablet   Commonly known as:  LEXAPRO   Stopped by:  Ham García PA-C                    Primary Care Provider Office Phone # Fax #    Torie Hines [phone]       [address] [address]        Equal Access to Services     [facility]: Hadii aad ku hadasho Soomaali, waaxda luqadaha, qaybta kaalmada adeegyada, waxay idiin hayaan adeeg kharash la'aan . So [facility] [phone].    ATENCIÓN: Si habla español, tiene a tolentino disposición servicios gratuitos de asistencia lingüística. [facility] [phone].    We comply with applicable federal civil rights laws and [place] laws. We do not discriminate on the basis of race, color, national origin, age, disability, sex, sexual orientation, or gender identity.            Thank you!     Thank you for choosing [facility]  for your care. Our goal is always to provide you with excellent care. Hearing back from our patients is one way we can continue to improve our services. Please take a few minutes to complete the written survey that you may receive in the mail after your visit with us. Thank you!             Your Updated Medication List - Protect others around you: Learn how to safely use, store and throw away your medicines at www.disposemymeds.org.      Notice  As of 11/15/2018  2:51 PM    You have not been prescribed any medications.

## 2018-11-15 NOTE — NURSING NOTE
Prior to injection verified patient identity using patient's name and date of birth.  Due to injection administration, patient instructed to remain in clinic for 15 minutes  afterwards, and to report any adverse reaction to me immediately.    Shruti Giles CMA ([facility])

## 2018-11-16 LAB
CHOLEST SERPL-MCNC: 199 MG/DL
GLUCOSE SERPL-MCNC: 80 MG/DL (ref 70–99)
HDLC SERPL-MCNC: 32 MG/DL
LDLC SERPL CALC-MCNC: 105 MG/DL
NONHDLC SERPL-MCNC: 167 MG/DL
TRIGL SERPL-MCNC: 310 MG/DL

## 2018-11-23 NOTE — NURSING NOTE
Wellness Screening form completed, copy made for chart, and faxed to [employer], [phone].    Thanks!  Sami Sotelo

## 2019-12-18 ENCOUNTER — OFFICE VISIT (OUTPATIENT)
Dept: FAMILY MEDICINE | Facility: CLINIC | Age: 34
End: 2019-12-18
Payer: COMMERCIAL

## 2019-12-18 VITALS
TEMPERATURE: 97.9 F | SYSTOLIC BLOOD PRESSURE: 132 MMHG | HEART RATE: 78 BPM | WEIGHT: 247 LBS | HEIGHT: 74 IN | DIASTOLIC BLOOD PRESSURE: 88 MMHG | BODY MASS INDEX: 31.7 KG/M2 | OXYGEN SATURATION: 98 %

## 2019-12-18 DIAGNOSIS — L71.9 ROSACEA: ICD-10-CM

## 2019-12-18 DIAGNOSIS — L70.0 CYSTIC ACNE VULGARIS: ICD-10-CM

## 2019-12-18 DIAGNOSIS — Z23 NEED FOR PROPHYLACTIC VACCINATION AND INOCULATION AGAINST INFLUENZA: ICD-10-CM

## 2019-12-18 DIAGNOSIS — Z00.00 ROUTINE GENERAL MEDICAL EXAMINATION AT A HEALTH CARE FACILITY: Primary | ICD-10-CM

## 2019-12-18 DIAGNOSIS — E78.1 HYPERTRIGLYCERIDEMIA: ICD-10-CM

## 2019-12-18 LAB
CHOLEST SERPL-MCNC: 208 MG/DL
GLUCOSE BLD-MCNC: 86 MG/DL (ref 70–99)
HDLC SERPL-MCNC: 48 MG/DL
LDLC SERPL CALC-MCNC: 134 MG/DL
NONHDLC SERPL-MCNC: 160 MG/DL
TRIGL SERPL-MCNC: 128 MG/DL

## 2019-12-18 PROCEDURE — 36415 COLL VENOUS BLD VENIPUNCTURE: CPT | Performed by: NURSE PRACTITIONER

## 2019-12-18 PROCEDURE — 99395 PREV VISIT EST AGE 18-39: CPT | Mod: 25 | Performed by: NURSE PRACTITIONER

## 2019-12-18 PROCEDURE — 82947 ASSAY GLUCOSE BLOOD QUANT: CPT | Performed by: NURSE PRACTITIONER

## 2019-12-18 PROCEDURE — 87389 HIV-1 AG W/HIV-1&-2 AB AG IA: CPT | Performed by: NURSE PRACTITIONER

## 2019-12-18 PROCEDURE — 90472 IMMUNIZATION ADMIN EACH ADD: CPT | Performed by: NURSE PRACTITIONER

## 2019-12-18 PROCEDURE — 90471 IMMUNIZATION ADMIN: CPT | Performed by: NURSE PRACTITIONER

## 2019-12-18 PROCEDURE — 90686 IIV4 VACC NO PRSV 0.5 ML IM: CPT | Performed by: NURSE PRACTITIONER

## 2019-12-18 PROCEDURE — 99213 OFFICE O/P EST LOW 20 MIN: CPT | Mod: 25 | Performed by: NURSE PRACTITIONER

## 2019-12-18 PROCEDURE — 90715 TDAP VACCINE 7 YRS/> IM: CPT | Performed by: NURSE PRACTITIONER

## 2019-12-18 PROCEDURE — 80061 LIPID PANEL: CPT | Performed by: NURSE PRACTITIONER

## 2019-12-18 RX ORDER — CLINDAMYCIN PHOSPHATE AND BENZOYL PEROXIDE 10; 50 MG/G; MG/G
GEL TOPICAL
Qty: 45 G | Refills: 0 | Status: SHIPPED | OUTPATIENT
Start: 2019-12-18

## 2019-12-18 RX ORDER — METRONIDAZOLE 10 MG/G
GEL TOPICAL DAILY
Qty: 60 G | Refills: 0 | Status: SHIPPED | OUTPATIENT
Start: 2019-12-18

## 2019-12-18 ASSESSMENT — ENCOUNTER SYMPTOMS
DIZZINESS: 0
CHILLS: 0
COUGH: 0
SORE THROAT: 0
MYALGIAS: 0
JOINT SWELLING: 0
DIARRHEA: 0
NAUSEA: 0
HEARTBURN: 0
HEMATURIA: 0
ARTHRALGIAS: 0
ABDOMINAL PAIN: 0
EYE PAIN: 0
CONSTIPATION: 0
PALPITATIONS: 0
HEMATOCHEZIA: 0
HEADACHES: 0
FREQUENCY: 0
DYSURIA: 0
NERVOUS/ANXIOUS: 0
PARESTHESIAS: 0
WEAKNESS: 0
FEVER: 0
SHORTNESS OF BREATH: 0

## 2019-12-18 ASSESSMENT — MIFFLIN-ST. JEOR: SCORE: 2140.13

## 2019-12-18 NOTE — PROGRESS NOTES
SUBJECTIVE:   CC: Rex Cummins is an 34 year old male who presents for preventative health visit.     Healthy Habits:     Getting at least 3 servings of Calcium per day:  NO    Bi-annual eye exam:  Yes    Dental care twice a year:  Yes    Sleep apnea or symptoms of sleep apnea:  None    Diet:  Regular (no restrictions) and Breakfast skipped    Frequency of exercise:  2-3 days/week    Duration of exercise:  15-30 minutes    Taking medications regularly:  Not Applicable    Medication side effects:  Not applicable    PHQ-2 Total Score: 0    Additional concerns today:  No    Rosacea  Erythematic areas around his cheeks and nose.  No formal diagnosis    Cystic acne  Ongoing has been using over-the-counter topical medication without much effect.  Washes his face with Cetaphil.    * biometric form needs to be completed for work.    Hypertriglyceridemia  Elevated when last checked says he drinks between 2-3 alcoholic beverages a night.  Monitor his diet for salt.    Today's PHQ-2 Score:   PHQ-2 ( 1999 Pfizer) 12/18/2019   Q1: Little interest or pleasure in doing things 0   Q2: Feeling down, depressed or hopeless 0   PHQ-2 Score 0   Q1: Little interest or pleasure in doing things Not at all   Q2: Feeling down, depressed or hopeless Not at all   PHQ-2 Score 0       Abuse: Current or Past(Physical, Sexual or Emotional)- No  Do you feel safe in your environment? Yes        Social History     Tobacco Use     Smoking status: Former Smoker     Types: Cigarettes, Dip, chew, snus or snuff     Smokeless tobacco: Never Used   Substance Use Topics     Alcohol use: Yes     Comment: Cutting down - socially, a few drinks here and there      If you drink alcohol do you typically have >3 drinks per day or >7 drinks per week? Yes      Alcohol Use 12/18/2019   Prescreen: >3 drinks/day or >7 drinks/week? Yes   Prescreen: >3 drinks/day or >7 drinks/week? -   AUDIT SCORE  6     AUDIT - Alcohol Use Disorders Identification Test - Reproduced  from the World Health Organization Audit 2001 (Second Edition) 12/18/2019   1.  How often do you have a drink containing alcohol? 4 or more times a week   2.  How many drinks containing alcohol do you have on a typical day when you are drinking? 1 or 2   3.  How often do you have five or more drinks on one occasion? Monthly   4.  How often during the last year have you found that you were not able to stop drinking once you had started? Never   5.  How often during the last year have you failed to do what was normally expected of you because of drinking? Never   6.  How often during the last year have you needed a first drink in the morning to get yourself going after a heavy drinking session? Never   7.  How often during the last year have you had a feeling of guilt or remorse after drinking? Never   8.  How often during the last year have you been unable to remember what happened the night before because of your drinking? Never   9.  Have you or someone else been injured because of your drinking? No   10. Has a relative, friend, doctor or other health care worker been concerned about your drinking or suggested you cut down? No   TOTAL SCORE 6       Last PSA: No results found for: PSA    Reviewed orders with patient. Reviewed health maintenance and updated orders accordingly - Yes  Lab work is in process    Reviewed and updated as needed this visit by clinical staff  Tobacco  Allergies  Meds         Reviewed and updated as needed this visit by Provider            Review of Systems   Constitutional: Negative for chills and fever.   HENT: Positive for congestion. Negative for ear pain, hearing loss and sore throat.    Eyes: Negative for pain and visual disturbance.   Respiratory: Negative for cough and shortness of breath.    Cardiovascular: Negative for chest pain, palpitations and peripheral edema.   Gastrointestinal: Negative for abdominal pain, constipation, diarrhea, heartburn, hematochezia and nausea.  "  Genitourinary: Negative for discharge, dysuria, frequency, genital sores, hematuria, impotence and urgency.   Musculoskeletal: Negative for arthralgias, joint swelling and myalgias.   Skin: Negative for rash.   Neurological: Negative for dizziness, weakness, headaches and paresthesias.   Psychiatric/Behavioral: Negative for mood changes. The patient is not nervous/anxious.      CONSTITUTIONAL: NEGATIVE for fever, chills, change in weight  INTEGUMENTARY/SKIN: NEGATIVE for worrisome rashes, moles or lesions  EYES: NEGATIVE for vision changes or irritation  ENT: NEGATIVE for ear, mouth and throat problems  RESP: NEGATIVE for significant cough or SOB  CV: NEGATIVE for chest pain, palpitations or peripheral edema  GI: NEGATIVE for nausea, abdominal pain, heartburn, or change in bowel habits   male: negative for dysuria, hematuria, decreased urinary stream, erectile dysfunction, urethral discharge  MUSCULOSKELETAL: NEGATIVE for significant arthralgias or myalgia  NEURO: NEGATIVE for weakness, dizziness or paresthesias  PSYCHIATRIC: NEGATIVE for changes in mood or affect    OBJECTIVE:   /88   Pulse 78   Temp 97.9  F (36.6  C) (Oral)   Ht 1.88 m (6' 2\")   Wt 112 kg (247 lb)   SpO2 98%   BMI 31.71 kg/m      Physical Exam  GENERAL: healthy, alert and no distress  EYES: Eyes grossly normal to inspection, PERRL and conjunctivae and sclerae normal  HENT: ear canals and TM's normal, nose and mouth without ulcers or lesions  NECK: no adenopathy, no asymmetry, masses, or scars and thyroid normal to palpation  RESP: lungs clear to auscultation - no rales, rhonchi or wheezes  CV: regular rate and rhythm, normal S1 S2, no S3 or S4, no murmur, click or rub, no peripheral edema and peripheral pulses strong  ABDOMEN: soft, nontender, no hepatosplenomegaly, no masses and bowel sounds normal  MS: no gross musculoskeletal defects noted, no edema  SKIN: Areas of erythema around cheeks and nose and cystic papules around cheeks " "forehead.  nEURO: Normal strength and tone, mentation intact and speech normal  PSYCH: mentation appears normal, affect normal/bright    Diagnostic Test Results:  Labs reviewed in Epic  No results found for this or any previous visit (from the past 24 hour(s)).    ASSESSMENT/PLAN:   1. Routine general medical examination at a health care facility  Preventive visit today with concerns addressed below  - HIV Screening  - TDAP VACCINE (ADACEL)  - Glucose, whole blood    2. Hypertriglyceridemia  Recommend limiting alcohol intake avoiding saturated fats recheck today  - Lipid panel reflex to direct LDL Fasting    3. Rosacea  Presentation consistent with rosacea  - metroNIDAZOLE (METROGEL) 1 % external gel; Apply topically daily  Dispense: 60 g; Refill: 0    4. Cystic acne vulgaris  Presentation consistent with cystic acne recommend spot treatment and using Cetaphil face wash twice daily avoid touching hands to face  - clindamycin phos-benzoyl perox (DUAC) 1.2-5 % external gel; Apply pea-sized amount to affected area once to twice daily.  Dispense: 45 g; Refill: 0    COUNSELING:   Reviewed preventive health counseling, as reflected in patient instructions       Regular exercise       Healthy diet/nutrition       Vision screening       Decrease alcohol intake    Estimated body mass index is 31.71 kg/m  as calculated from the following:    Height as of this encounter: 1.88 m (6' 2\").    Weight as of this encounter: 112 kg (247 lb).     Weight management plan: Discussed healthy diet and exercise guidelines     reports that he has quit smoking. His smoking use included cigarettes and dip, chew, snus or snuff. He has never used smokeless tobacco.      Counseling Resources:  ATP IV Guidelines  Pooled Cohorts Equation Calculator  FRAX Risk Assessment  ICSI Preventive Guidelines  Dietary Guidelines for Americans, 2010  USDA's MyPlate  ASA Prophylaxis  Lung CA Screening    LIANA [facility] JULI

## 2019-12-19 LAB — HIV 1+2 AB+HIV1 P24 AG SERPL QL IA: NONREACTIVE

## 2019-12-24 ENCOUNTER — TELEPHONE (OUTPATIENT)
Dept: URGENT CARE | Facility: URGENT CARE | Age: 34
End: 2019-12-24

## 2019-12-24 NOTE — TELEPHONE ENCOUNTER
Forms received from [employer]/ Wellness Screening Form  for Joyce Gilliam CNP.  Forms placed in provider 'sign me' folder.  Please fax forms to [phone] after completion.    Renetta Muñoz  Patient Representative

## 2020-07-08 ENCOUNTER — VIRTUAL VISIT (OUTPATIENT)
Dept: FAMILY MEDICINE | Facility: OTHER | Age: 35
End: 2020-07-08

## 2020-07-08 ENCOUNTER — NURSE TRIAGE (OUTPATIENT)
Dept: NURSING | Facility: CLINIC | Age: 35
End: 2020-07-08

## 2020-07-09 NOTE — PROGRESS NOTES
"Date: 2020 22:46:36  Clinician: Jeffrey Martinez  Clinician NPI: [id number]  Patient: Rex Cummins  Patient : 1985  Patient Address: [address] [place] [id number]  Patient Phone: [phone]  Visit Protocol: URI  Patient Summary:  Rex is a 35 year old ( : 1985 ) male who initiated a Visit for COVID-19 (Coronavirus) evaluation and screening. When asked the question \"Please sign me up to receive news, health information and promotions. \", Rex responded \"No\".    Rex states his symptoms started 1-2 days ago.   His symptoms consist of tooth pain, diarrhea, rhinitis, facial pain or pressure, and nasal congestion. He is experiencing difficulty breathing due to nasal congestion but he is not short of breath.   Symptom details     Nasal secretions: The color of his mucus is white, green, and yellow.    Facial pain or pressure: The facial pain or pressure feels worse when bending over or leaning forward.     Tooth pain: The tooth pain is not caused by a cavity, recent dental work, or other mouth problems.      Rex denies having wheezing, nausea, ageusia, vomiting, malaise, ear pain, headache, chills, sore throat, myalgias, anosmia, fever, and cough. He also denies having recent facial or sinus surgery in the past 60 days, taking antibiotic medication in the past month, and having a sinus infection within the past year.    Pertinent COVID-19 (Coronavirus) information  In the past 14 days, Rex has not worked in a congregate living setting.   He does not work or volunteer as healthcare worker or a  and does not work or volunteer in a healthcare facility.   Rex also has not lived in a congregate living setting in the past 14 days. He does not live with a healthcare worker.   Rex has had a close contact with a laboratory-confirmed COVID-19 patient within 14 days of symptom onset. Additional information about contact with COVID-19 (Coronavirus) patient " as reported by the patient (free text): See prior apt notes   Pertinent medical history  Rex does not need a return to work/school note.   Weight: 245 lbs   Rex smokes or uses smokeless tobacco.   Weight: 245 lbs  Reason for repeat visit for the same protocol within 24 hours:  Please complete my summary. It only states that I need to do visits for my family.  See the History of referred by protocol and completed visits section for details on previous visits (visits currently in queue to be diagnosed will not appear in this section).    MEDICATIONS: No current medications, ALLERGIES: Penicillins  Clinician Response:  Dear Rex Goodman,  Your wife and child EACH need to be enrolled in [facility] so we can generate a record and an order for them to be tested. You now of a record of your symptoms. Call [phone] and schedule YOUR testing. Once the others have gone through the [facility] process like you, we can order their testing.  JEFFREY Martinez MD    Diagnosis: Cough  Diagnosis ICD: R05

## 2020-07-09 NOTE — PROGRESS NOTES
"Date: 2020 23:05:17  Clinician: Monique Felix  Clinician NPI: [id number]  Patient: Rex Cummins  Patient : 1985  Patient Address: [address] [place] [address]  Patient Phone: [phone]  Visit Protocol: URI  Patient Summary:  Rex is a 35 year old ( : 1985 ) male who initiated a Visit for COVID-19 (Coronavirus) evaluation and screening. When asked the question \"Please sign me up to receive news, health information and promotions. \", Rex responded \"No\".    Rex states his symptoms started 1-2 days ago.   His symptoms consist of tooth pain, diarrhea, rhinitis, facial pain or pressure, and nasal congestion. He is experiencing difficulty breathing due to nasal congestion but he is not short of breath.   Symptom details     Nasal secretions: The color of his mucus is white, green, and yellow.    Facial pain or pressure: The facial pain or pressure feels worse when bending over or leaning forward.     Tooth pain: The tooth pain is not caused by a cavity, recent dental work, or other mouth problems.      Rex denies having wheezing, nausea, ageusia, vomiting, malaise, ear pain, headache, chills, sore throat, myalgias, anosmia, fever, and cough. He also denies having recent facial or sinus surgery in the past 60 days, taking antibiotic medication in the past month, and having a sinus infection within the past year.    Pertinent COVID-19 (Coronavirus) information  In the past 14 days, Rex has not worked in a congregate living setting.   He does not work or volunteer as healthcare worker or a  and does not work or volunteer in a healthcare facility.   Rex also has not lived in a congregate living setting in the past 14 days. He does not live with a healthcare worker.   Rex has had a close contact with a laboratory-confirmed COVID-19 patient within 14 days of symptom onset. Additional information about contact with COVID-19 (Coronavirus) patient " as reported by the patient (free text): See prior visit   Pertinent medical history  Rex does not need a return to work/school note.   Weight: 245 lbs   Rex smokes or uses smokeless tobacco.   Weight: 245 lbs  Reason for repeat visit for the same protocol within 24 hours:  Please complete my Order so I can schedule all three of us for a drive up test. My wife and Son have orders from Jeffrey Martinez MD. Visit ID:[id number] and ID:[id number]. When I called to make an apt I did not have an order attached to my visit ID.  See the History of referred by protocol and completed visits section for details on previous visits (visits currently in queue to be diagnosed will not appear in this section).    MEDICATIONS: No current medications, ALLERGIES: Penicillins  Clinician Response:  Dear Rex,  I am sorry you are not feeling well. Your health is our priority. Based on the information you have provided, it is possible that you may have some type of viral infection.  Please read the full treatment plan and see my recommendations below.  Medication information  Because you have a viral infection, antibiotics will not help you get better. Treating a viral infection with antibiotics could actually make you feel worse.  Self care  Steps you can take to be as comfortable as possible:     Rest.    Drink plenty of fluids.    Take a warm shower to loosen congestion    Use a cool-mist humidifier.     Also, as your provider, I need you to know that becoming tobacco-free is the most important thing you can do to protect your current and future health.  Additional treatment plan   Your symptoms show that you may have coronavirus (COVID-19). This illness can cause fever, cough and trouble breathing. Many people get a mild case and get better on their own. Some people can get very sick.  What should I do?  We would like to test you for this virus.   1. Please call [phone] to schedule your visit. Explain that you were referred by  "[facility] to have a COVID-19 test. Be ready to share your [facility] visit ID number.  The following will serve as your written order for this COVID Test, ordered by me, for the indication of suspected COVID [Z20.828]: The test will be ordered in [facility], our electronic health record, after you are scheduled. It will show as ordered and authorized by Ayden Soto MD.  Order: COVID-19 (Coronavirus) PCR for SYMPTOMATIC testing from [facility].      2. When it's time for your COVID test:  Stay at least 6 feet away from others. (If someone will drive you to your test, stay in the backseat, as far away from the  as you can.)   Cover your mouth and nose with a mask, tissue or washcloth.  Go straight to the testing site. Don't make any stops on the way there or back.      3.Starting now: Stay home and away from others (self-isolate) until:   You've had no fever---and no medicine that reduces fever---for 3 full days (72 hours). And...   Your other symptoms have gotten better. For example, your cough or breathing has improved. And...   At least 10 days have passed since your symptoms started.       During this time, don't leave the house except for testing or medical care.   Stay in your own room, even for meals. Use your own bathroom if you can.   Stay away from others in your home. No hugging, kissing or shaking hands. No visitors.  Don't go to work, school or anywhere else.    Clean \"high touch\" surfaces often (doorknobs, counters, handles, etc.). Use a household cleaning spray or wipes. You'll find a full list of  on the EPA website: www.epa.gov/pesticide-registration/list-n-disinfectants-use-against-sars-cov-2.   Cover your mouth and nose with a mask, tissue or washcloth to avoid spreading germs.  Wash your hands and face often. Use soap and water.  Caregivers in these groups are at risk for severe illness due to COVID-19:  o People 65 years and older  o People who live in a nursing home or long-term care facility  o " People with chronic disease (lung, heart, cancer, diabetes, kidney, liver, immunologic)  o People who have a weakened immune system, including those who:   Are in cancer treatment  Take medicine that weakens the immune system, such as corticosteroids  Had a bone marrow or organ transplant  Have an immune deficiency  Have poorly controlled HIV or AIDS  Are obese (body mass index of 40 or higher)  Smoke regularly   o Caregivers should wear gloves while washing dishes, handling laundry and cleaning bedrooms and bathrooms.  o Use caution when washing and drying laundry: Don't shake dirty laundry, and use the warmest water setting that you can.  o For more tips, go to www.cdc.gov/coronavirus/2019-ncov/downloads/10Things.pdf.    4.Sign up for [employer]. We know it's scary to hear that you might have COVID-19. We want to track your symptoms to make sure you're okay over the next 2 weeks. Please look for an email from [employer]---this is a free, online program that we'll use to keep in touch. To sign up, follow the link in the email. Learn more at [email or web address]  How can I take care of myself?   Get lots of rest. Drink extra fluids (unless a doctor has told you not to).   Take Tylenol (acetaminophen) for fever or pain. If you have liver or kidney problems, ask your family doctor if it's okay to take Tylenol.   Adults can take either:    650 mg (two 325 mg pills) every 4 to 6 hours, or...   1,000 mg (two 500 mg pills) every 8 hours as needed.    Note: Don't take more than 3,000 mg in one day. Acetaminophen is found in many medicines (both prescribed and over-the-counter medicines). Read all labels to be sure you don't take too much.   For children, check the Tylenol bottle for the right dose. The dose is based on the child's age or weight.    If you have other health problems (like cancer, heart failure, an organ transplant or severe kidney disease): Call your specialty clinic if you don't feel  better in the next 2 days.       Know when to call 911. Emergency warning signs include:    Trouble breathing or shortness of breath Pain or pressure in the chest that doesn't go away Feeling confused like you haven't felt before, or not being able to wake up Bluish-colored lips or face.  Where can I get more information?   [facility] -- About COVID-19: [email or web address]   CDC -- What to Do If You're Sick: www.cdc.gov/coronavirus/2019-ncov/about/steps-when-sick.html   CDC -- Ending Home Isolation: www.cdc.gov/coronavirus/2019-ncov/hcp/disposition-in-home-patients.html   CDC -- Caring for Someone: www.cdc.gov/coronavirus/2019-ncov/if-you-are-sick/care-for-someone.html   [facility] -- Interim Guidance for Hospital Discharge to Home: www.[facility].[place]/diseases/coronavirus/hcp/hospdischarge.pdf   [facility] clinical trials (COVID-19 research studies): clinicalaffairs.[facility]-clinical-trials    Below are the COVID-19 hotlines at the [facility] ([facility]). Interpreters are available.    For health questions: Call 134-203-9451 or 1-806.407.7851 (7 a.m. to 7 p.m.) For questions about schools and childcare: Call 048-885-6306 or 1-859.398.2453 (7 a.m. to 7 p.m.)    Diagnosis: Nasal congestion  Diagnosis ICD: R09.81

## 2020-07-09 NOTE — PROGRESS NOTES
"Date: 2020 21:10:24  Clinician: Jeffrey Martinez  Clinician NPI: [id number]  Patient: Rex Cummins  Patient : 1985  Patient Address: [address] [place] [address]  Patient Phone: [phone]  Visit Protocol: URI  Patient Summary:  Rex is a 35 year old ( : 1985 ) male who initiated a Visit for COVID-19 (Coronavirus) evaluation and screening. When asked the question \"Please sign me up to receive news, health information and promotions. \", Rex responded \"No\".    Rex states his symptoms started 1-2 days ago.   His symptoms consist of tooth pain, diarrhea, rhinitis, facial pain or pressure, and nasal congestion. He is experiencing difficulty breathing due to nasal congestion but he is not short of breath.   Symptom details     Nasal secretions: The color of his mucus is white, green, and yellow.    Facial pain or pressure: The facial pain or pressure feels worse when bending over or leaning forward.     Tooth pain: The tooth pain is not caused by a cavity, recent dental work, or other mouth problems.      Rex denies having wheezing, nausea, ageusia, vomiting, malaise, ear pain, headache, chills, sore throat, myalgias, anosmia, fever, and cough. He also denies having recent facial or sinus surgery in the past 60 days, taking antibiotic medication in the past month, and having a sinus infection within the past year.    Pertinent COVID-19 (Coronavirus) information  In the past 14 days, Rex has not worked in a congregate living setting.   He does not work or volunteer as healthcare worker or a  and does not work or volunteer in a healthcare facility.   Rex also has not lived in a congregate living setting in the past 14 days. He does not live with a healthcare worker.   Rex has had a close contact with a laboratory-confirmed COVID-19 patient within 14 days of symptom onset. Additional information about contact with COVID-19 (Coronavirus) patient " as reported by the patient (free text): Was exposed at 4th of July gathering, Friend starting feeling ill after a couple hours and left. He was tested on 7/5 and was confirmed positive 7/7. We were in close proximity indoors and outdoors.   Pertinent medical history  Rex does not need a return to work/school note.   Weight: 245 lbs   Rex smokes or uses smokeless tobacco.   Additional information as reported by the patient (free text): I would like to get my Wife and child tested as well. We all had the same level of exposure.  My Son is showing similar symptoms to me.   Weight: 245 lbs    MEDICATIONS: No current medications, ALLERGIES: Penicillins  Clinician Response:  Dear Rex Goodman,  Enroll each relative on this [facility] so we can generate a record and an order. JEFFREY Martniez MD    Diagnosis: Cough  Diagnosis ICD: R05

## 2020-07-09 NOTE — TELEPHONE ENCOUNTER
Patient calling says he did [facility] visits this evening for himself, his wife and child asking for COVID19 testing.  He says he got the response and orders for wife and child but not himself.   [facility] help line given to patient.    Brianna Jones RN  Triage Nurse Advisor

## 2020-07-10 DIAGNOSIS — Z20.822 SUSPECTED COVID-19 VIRUS INFECTION: Primary | ICD-10-CM

## 2020-07-10 PROCEDURE — U0003 INFECTIOUS AGENT DETECTION BY NUCLEIC ACID (DNA OR RNA); SEVERE ACUTE RESPIRATORY SYNDROME CORONAVIRUS 2 (SARS-COV-2) (CORONAVIRUS DISEASE [COVID-19]), AMPLIFIED PROBE TECHNIQUE, MAKING USE OF HIGH THROUGHPUT TECHNOLOGIES AS DESCRIBED BY CMS-2020-01-R: HCPCS | Performed by: FAMILY MEDICINE

## 2020-07-10 NOTE — LETTER
July 16, 2020        Rex LOPEZ [place] [address]  [address] [address]    This letter provides a written record that you were tested for COVID-19 on 07/10/2020.       Your result was negative. This means that we didn t find the virus that causes COVID-19 in your sample. A test may show negative when you do actually have the virus. This can happen when the virus is in the early stages of infection, before you feel illness symptoms.    If you have symptoms   Stay home and away from others (self-isolate) until you meet ALL of the guidelines below:    You ve had no fever--and no medicine that reduces fever--for 3 full days (72 hours). And      Your other symptoms have gotten better. For example, your cough or breathing has improved. And     At least 10 days have passed since your symptoms started.    During this time:    Stay home. Don t go to work, school or anywhere else.     Stay in your own room, including for meals. Use your own bathroom if you can.    Stay away from others in your home. No hugging, kissing or shaking hands. No visitors.    Clean  high touch  surfaces often (doorknobs, counters, handles, etc.). Use a household cleaning spray or wipes. You can find a full list on the EPA website at www.epa.gov/pesticide-registration/list-n-disinfectants-use-against-sars-cov-2.    Cover your mouth and nose with a mask, tissue or washcloth to avoid spreading germs.    Wash your hands and face often with soap and water.    Going back to work  Check with your employer for any guidelines to follow for going back to work.    Employers: This document serves as formal notice that your employee tested negative for COVID-19, as of the testing date shown above.

## 2020-07-11 LAB
SARS-COV-2 RNA SPEC QL NAA+PROBE: NOT DETECTED
SPECIMEN SOURCE: NORMAL

## 2020-11-02 ENCOUNTER — OFFICE VISIT (OUTPATIENT)
Dept: FAMILY MEDICINE | Facility: CLINIC | Age: 35
End: 2020-11-02
Payer: COMMERCIAL

## 2020-11-02 VITALS
OXYGEN SATURATION: 98 % | BODY MASS INDEX: 30.06 KG/M2 | WEIGHT: 234.25 LBS | SYSTOLIC BLOOD PRESSURE: 121 MMHG | HEART RATE: 75 BPM | HEIGHT: 74 IN | DIASTOLIC BLOOD PRESSURE: 82 MMHG | TEMPERATURE: 98.3 F

## 2020-11-02 DIAGNOSIS — M54.42 ACUTE LEFT-SIDED LOW BACK PAIN WITH LEFT-SIDED SCIATICA: ICD-10-CM

## 2020-11-02 DIAGNOSIS — M54.16 LUMBAR RADICULOPATHY: Primary | ICD-10-CM

## 2020-11-02 PROCEDURE — 99214 OFFICE O/P EST MOD 30 MIN: CPT | Performed by: PHYSICIAN ASSISTANT

## 2020-11-02 RX ORDER — PREDNISONE 20 MG/1
40 TABLET ORAL DAILY
Qty: 10 TABLET | Refills: 0 | Status: SHIPPED | OUTPATIENT
Start: 2020-11-02 | End: 2020-11-07

## 2020-11-02 ASSESSMENT — MIFFLIN-ST. JEOR: SCORE: 2069.36

## 2020-11-02 NOTE — PROGRESS NOTES
"Subjective     Rex Cummins is a 35 year old male who presents to clinic today for the following health issues:    HPI         Back Pain  Onset/Duration: 10/24/20  Description:   Location of pain: low back left  Character of pain: sharp  Pain radiation: radiates into the left leg  New numbness or weakness in legs, not attributed to pain: no   Intensity: Currently 3-41/10, At its worst 10/10  Progression of Symptoms: worsening and intermittent  History:   Specific cause: lifting  Pain interferes with job: YES  History of back problems: no prior back problems  Any previous MRI or X-rays: None  Sees a specialist for back pain: No  Alleviating factors:   Improved by: IBU and walking  Precipitating factors:  Worsened by: Lifting, Bending, Sitting, Lying Flat, Coughing/Sneezing, Stairs and twisting  Therapies tried and outcome: Ibu helps for a bit    Lifting boat cover, as he was lifting he slipped on the ice. Caught himself. That night was pretty painful. Has slowly been improving - now more of a pinching pain.   Radiates down left leg to shin.     for a medical device company. Discomfort at work, doesn't necessarily prevent him from performing work duties.    Accompanying Signs & Symptoms:  Risk of Fracture: None  Risk of Cauda Equina: None  Risk of Infection: None  Risk of Cancer: None  Risk of Ankylosing Spondylitis: Onset at age <35, male, AND morning back stiffness  no       Review of Systems   Constitutional, HEENT, cardiovascular, pulmonary, gi and gu systems are negative, except as otherwise noted.      Objective    /82 (BP Location: Right arm, Patient Position: Sitting, Cuff Size: Adult Large)   Pulse 75   Temp 98.3  F (36.8  C) (Oral)   Ht 1.867 m (6' 1.5\")   Wt 106.3 kg (234 lb 4 oz)   SpO2 98%   BMI 30.49 kg/m    Body mass index is 30.49 kg/m .  Physical Exam   GENERAL: healthy, alert and no distress  NECK: no adenopathy, no asymmetry, masses, or scars and thyroid normal to " "palpation  RESP: lungs clear to auscultation - no rales, rhonchi or wheezes  CV: regular rate and rhythm, normal S1 S2, no S3 or S4, no murmur, click or rub, no peripheral edema and peripheral pulses strong  ABDOMEN: soft, nontender, no hepatosplenomegaly, no masses and bowel sounds normal  MS: no gross musculoskeletal defects noted, no edema  BACK: left sided lumbar paraspinal muscle spasm, pain with extension. Positive left straight leg raise. Forward flexion within normal limits.            Assessment & Plan     Lumbar radiculopathy  Acute left-sided low back pain with left-sided sciatica  Acute low back pain should improve significantly with prednisone and physical therapy. Advised he avoid long periods of immobility as well. Can use OTC NSAIDs after prednisone if not improving. No xray indicated as plan of care will not change. No MRI indicated as plan of care will not change. If symptoms are not improving with 6+ weeks of physical therapy, will consider imaging. Patient is in agreement and has no further questions.   - CAN PT, HAND, AND CHIROPRACTIC REFERRAL; Future  - predniSONE (DELTASONE) 20 MG tablet; Take 2 tablets (40 mg) by mouth daily for 5 days       BMI:   Estimated body mass index is 30.49 kg/m  as calculated from the following:    Height as of this encounter: 1.867 m (6' 1.5\").    Weight as of this encounter: 106.3 kg (234 lb 4 oz).          Return if symptoms worsen or fail to improve.    Yvonne Lara PA-C  [facility]    "

## 2020-11-09 ENCOUNTER — THERAPY VISIT (OUTPATIENT)
Dept: PHYSICAL THERAPY | Facility: CLINIC | Age: 35
End: 2020-11-09
Attending: PHYSICIAN ASSISTANT
Payer: COMMERCIAL

## 2020-11-09 DIAGNOSIS — M54.16 LUMBAR RADICULOPATHY: ICD-10-CM

## 2020-11-09 DIAGNOSIS — M54.42 BILATERAL LOW BACK PAIN WITH LEFT-SIDED SCIATICA: ICD-10-CM

## 2020-11-09 PROCEDURE — 97110 THERAPEUTIC EXERCISES: CPT | Mod: GP | Performed by: PHYSICAL THERAPIST

## 2020-11-09 PROCEDURE — 97161 PT EVAL LOW COMPLEX 20 MIN: CPT | Mod: GP | Performed by: PHYSICAL THERAPIST

## 2020-11-09 NOTE — LETTER
[facility]  [address]  [address]  [place] [address]  [phone]    November 10, 2020    Re: Rex Cummins   :   1985  MRN:  [id number]   REFERRING PHYSICIAN:   DEEP Holt PT  Date of Initial Evaluation:  2020  Visits:  Rxs Used: 1  Reason for Referral:     Lumbar radiculopathy  Bilateral low back pain with left-sided sciatica    EVALUATION SUMMARY    [facility] Initial Evaluation  Subjective:  The history is provided by the patient. No  was used.   Patient Health History  Rex Cummins being seen for Low Back Pain .   Problem began: 10/24/2020.   Problem occurred: standing in the boat while lifting a Boat cover, slipping on ice and catching himself   Pain is reported as 3/10 on pain scale.  General health as reported by patient is good.  Pertinent medical history includes: none.   Red flags:  Pain at rest/night.     Current medications:  Anti-inflammatory.    Current occupation is - still going into the office.   Primary job tasks include:  Prolonged sitting.           Therapist Generated HPI Evaluation     Type of problem:  Lumbar.  This is a new condition.  Condition occurred with:  A fall/slip.  Where condition occurred: at home.  Patient reports pain:  Lumbar spine left.  and is constant.  Pain radiates to:  No radiation (previously was going down the left leg and into the left shin).   Since onset symptoms are gradually improving.  Symptoms are exacerbated by sitting, lifting and bending  and relieved by activity/movement.    Restrictions due to condition include:  Working in normal job without restrictions.  Barriers include:  None as reported by patient.    Re: Rex Cummins   :   1985            Objective:  Lumbar/SI Evaluation  Lumbar Myotomes:  normal  Lumbar Dermtomes:  normal    Neural Tension/Mobility:    Left side:  Slump positive.   Right side:   Slump  negative.     Sonya  Lumbar Evaluation  Posture:  Sitting: poor  Standing: poor  Lordosis: Reduced  Lateral Shift: no  Correction of Posture: no effect    Movement Loss:  Flexion (Flex): min and pain  Extension (EXT): major and pain  Side [place] R (SG R): min and pain  Side [place] L (SG L): nil and pain    Test Movements:  FIS: During: increases  Pretest Movements: 3/10 low back pain into left anterior thigh  Repeat FIS: During: increases  After: no worse    EIS: During: no effect    Repeat EIS: During: no effect  After: no effect  Mechanical Response: no effect    EIL: During: decreases    Repeat EIL: During: decreases  After: better and centralizing  Mechanical Response: IncROM    Conclusion: derangement  Principle of Treatment:  Extension: prone press ups every 2-3 hours during the day, x10 repetitions.     Assessment/Plan:    Patient is a 35 year old male with lumbar complaints.    Patient has the following significant findings with corresponding treatment plan.                Diagnosis 1:  Low Back Pain with Left Sided Radiculopathy  Pain -  manual therapy, self management, education and home program  Decreased ROM/flexibility - manual therapy and therapeutic exercise  Impaired posture - neuro re-education    Therapy Evaluation Codes:   1) History comprised of:   Personal factors that impact the plan of care:      None.    Comorbidity factors that impact the plan of care are:      None.    Re: Rex Cummins   :   1985     Medications impacting care: None.  2) Examination of Body Systems comprised of:   Body structures and functions that impact the plan of care:      Lumbar spine.   Activity limitations that impact the plan of care are:      sitting, bending, lifting.  3) Clinical presentation characteristics are:   Stable/Uncomplicated.  4) Decision-Making    Low complexity using standardized patient assessment instrument and/or measureable assessment of functional outcome.  Cumulative Therapy Evaluation is: Low  complexity.    Previous and current functional limitations:  (See Goal Flow Sheet for this information)    Short term and Long term goals: (See Goal Flow Sheet for this information)     Communication ability:  Patient appears to be able to clearly communicate and understand verbal and written communication and follow directions correctly.  Treatment Explanation - The following has been discussed with the patient:   RX ordered/plan of care  Anticipated outcomes  Possible risks and side effects  This patient would benefit from PT intervention to resume normal activities.   Rehab potential is excellent.    Frequency:  1 X week, once daily  Duration:  for 6 weeks  Discharge Plan:  Achieve all LTG.  Independent in home treatment program.  Reach maximal therapeutic benefit.    Please refer to the daily flowsheet for treatment today, total treatment time and time spent performing 1:1 timed codes.       Thank you for your referral.    INQUIRIES  Therapist: Monique Burrell, PT, DPT  [facility]  [address]  [address]  [place] [address]  Phone: [phone]  Fax: [phone]

## 2020-11-09 NOTE — PROGRESS NOTES
[facility] Initial Evaluation  Subjective:  The history is provided by the patient. No  was used.   Patient Health History  Rex Cummins being seen for Low Back Pain .     Problem began: 10/24/2020.   Problem occurred: standing in the boat while lifting a Boat cover, slipping on ice and catching himself   Pain is reported as 3/10 on pain scale.  General health as reported by patient is good.  Pertinent medical history includes: none.   Red flags:  Pain at rest/night.         Current medications:  Anti-inflammatory.    Current occupation is - still going into the office.   Primary job tasks include:  Prolonged sitting.                  Therapist Generated HPI Evaluation         Type of problem:  Lumbar.    This is a new condition.  Condition occurred with:  A fall/slip.  Where condition occurred: at home.  Patient reports pain:  Lumbar spine left.  and is constant.  Pain radiates to:  No radiation (previously was going down the left leg and into the left shin).   Since onset symptoms are gradually improving.  Symptoms are exacerbated by sitting, lifting and bending  and relieved by activity/movement.      Restrictions due to condition include:  Working in normal job without restrictions.  Barriers include:  None as reported by patient.                        Objective:  System         Lumbar/SI Evaluation    Lumbar Myotomes:  normal                Lumbar Dermtomes:  normal                Neural Tension/Mobility:    Left side:  Slump positive.     Right side:   Slump  negative.                                                          Sonya Lumbar Evaluation    Posture:  Sitting: poor  Standing: poor  Lordosis: Reduced  Lateral Shift: no  Correction of Posture: no effect    Movement Loss:  Flexion (Flex): min and pain  Extension (EXT): major and pain  Side [place] R (SG R): min and pain  Side [place] L (SG L): nil and pain  Test Movements:  FIS: During: increases   Pretest Movements: 3/10 low back pain into left anterior thigh  Repeat FIS: During: increases  After: no worse    EIS: During: no effect    Repeat EIS: During: no effect  After: no effect  Mechanical Response: no effect    EIL: During: decreases    Repeat EIL: During: decreases  After: better and centralizing  Mechanical Response: IncROM        Conclusion: derangement  Principle of Treatment:      Extension: prone press ups every 2-3 hours during the day, x10 repetitions.                                            ROS    Assessment/Plan:    Patient is a 35 year old male with lumbar complaints.    Patient has the following significant findings with corresponding treatment plan.                Diagnosis 1:  Low Back Pain with Left Sided Radiculopathy  Pain -  manual therapy, self management, education and home program  Decreased ROM/flexibility - manual therapy and therapeutic exercise  Impaired posture - neuro re-education    Therapy Evaluation Codes:   1) History comprised of:   Personal factors that impact the plan of care:      None.    Comorbidity factors that impact the plan of care are:      None.     Medications impacting care: None.  2) Examination of Body Systems comprised of:   Body structures and functions that impact the plan of care:      Lumbar spine.   Activity limitations that impact the plan of care are:      sitting, bending, lifting.  3) Clinical presentation characteristics are:   Stable/Uncomplicated.  4) Decision-Making    Low complexity using standardized patient assessment instrument and/or measureable assessment of functional outcome.  Cumulative Therapy Evaluation is: Low complexity.    Previous and current functional limitations:  (See Goal Flow Sheet for this information)    Short term and Long term goals: (See Goal Flow Sheet for this information)     Communication ability:  Patient appears to be able to clearly communicate and understand verbal and written communication and follow  directions correctly.  Treatment Explanation - The following has been discussed with the patient:   RX ordered/plan of care  Anticipated outcomes  Possible risks and side effects  This patient would benefit from PT intervention to resume normal activities.   Rehab potential is excellent.    Frequency:  1 X week, once daily  Duration:  for 6 weeks  Discharge Plan:  Achieve all LTG.  Independent in home treatment program.  Reach maximal therapeutic benefit.    Please refer to the daily flowsheet for treatment today, total treatment time and time spent performing 1:1 timed codes.

## 2020-11-16 ENCOUNTER — THERAPY VISIT (OUTPATIENT)
Dept: PHYSICAL THERAPY | Facility: CLINIC | Age: 35
End: 2020-11-16
Payer: COMMERCIAL

## 2020-11-16 DIAGNOSIS — M54.42 BILATERAL LOW BACK PAIN WITH LEFT-SIDED SCIATICA: ICD-10-CM

## 2020-11-16 PROCEDURE — 97110 THERAPEUTIC EXERCISES: CPT | Mod: GP | Performed by: PHYSICAL THERAPIST

## 2020-11-23 ENCOUNTER — THERAPY VISIT (OUTPATIENT)
Dept: PHYSICAL THERAPY | Facility: CLINIC | Age: 35
End: 2020-11-23
Payer: COMMERCIAL

## 2020-11-23 DIAGNOSIS — M54.42 BILATERAL LOW BACK PAIN WITH LEFT-SIDED SCIATICA: ICD-10-CM

## 2020-11-23 PROCEDURE — 97110 THERAPEUTIC EXERCISES: CPT | Mod: GP | Performed by: PHYSICAL THERAPIST

## 2020-12-13 ENCOUNTER — HEALTH MAINTENANCE LETTER (OUTPATIENT)
Age: 35
End: 2020-12-13

## 2021-01-04 PROBLEM — M54.42 BILATERAL LOW BACK PAIN WITH LEFT-SIDED SCIATICA: Status: RESOLVED | Noted: 2020-11-09 | Resolved: 2021-01-04

## 2021-01-04 NOTE — PROGRESS NOTES
Discharge Note    Progress reporting period is from last progress note on   to Nov 23, 2020.    Rex failed to follow up and current status is unknown.  Please see information below for last relevant information on current status.  Patient seen for 3 visits.    SUBJECTIVE  Subjective changes noted by patient:  Doing well. Main c/o lumbar stiffness. Tolerated hunting this past weekend better but prolonged sitting still produces minor aggravation/  .  Current pain level is 1/10.     Previous pain level was  3/10.   Changes in function:  Yes (See Goal flowsheet attached for changes in current functional level)  Adverse reaction to treatment or activity: None    OBJECTIVE  Changes noted in objective findings: Added core routine per flow.     ASSESSMENT/PLAN  Diagnosis: Low Back Pain    Updated problem list and treatment plan:   Pain - HEP  STG/LTGs have been met or progress has been made towards goals:  Yes, please see goal flowsheet for most current information  Assessment of Progress: current status is unknown.    Last current status: Pt is progressing well   Self Management Plans:  HEP  I have re-evaluated this patient and find that the nature, scope, duration and intensity of the therapy is appropriate for the medical condition of the patient.  Rex continues to require the following intervention to meet STG and LTG's:  HEP.    Recommendations:  Discharge with current home program.  Patient to follow up with MD as needed.    Please refer to the daily flowsheet for treatment today, total treatment time and time spent performing 1:1 timed codes.

## 2021-02-21 ENCOUNTER — HEALTH MAINTENANCE LETTER (OUTPATIENT)
Age: 36
End: 2021-02-21

## 2021-04-26 ENCOUNTER — E-VISIT (OUTPATIENT)
Dept: URGENT CARE | Facility: CLINIC | Age: 36
End: 2021-04-26
Payer: COMMERCIAL

## 2021-04-26 DIAGNOSIS — Z20.822 SUSPECTED COVID-19 VIRUS INFECTION: Primary | ICD-10-CM

## 2021-04-26 DIAGNOSIS — Z20.822 SUSPECTED COVID-19 VIRUS INFECTION: ICD-10-CM

## 2021-04-26 PROCEDURE — U0003 INFECTIOUS AGENT DETECTION BY NUCLEIC ACID (DNA OR RNA); SEVERE ACUTE RESPIRATORY SYNDROME CORONAVIRUS 2 (SARS-COV-2) (CORONAVIRUS DISEASE [COVID-19]), AMPLIFIED PROBE TECHNIQUE, MAKING USE OF HIGH THROUGHPUT TECHNOLOGIES AS DESCRIBED BY CMS-2020-01-R: HCPCS | Performed by: NURSE PRACTITIONER

## 2021-04-26 PROCEDURE — U0005 INFEC AGEN DETEC AMPLI PROBE: HCPCS | Performed by: NURSE PRACTITIONER

## 2021-04-26 PROCEDURE — 99421 OL DIG E/M SVC 5-10 MIN: CPT | Performed by: NURSE PRACTITIONER

## 2021-04-26 NOTE — PATIENT INSTRUCTIONS
Dear Rex Cummins,    Your symptoms show that you may have coronavirus (COVID-19). This illness can cause fever, cough and trouble breathing. Many people get a mild case and get better on their own. Some people can get very sick.    Will I be tested for COVID-19?  We would like to test you for Covid-19 virus. I have placed orders for this test.     To schedule: go to your [employer] home page and scroll down to the section that says  You have an appointment that needs to be scheduled  and click the large green button that says  Schedule Now  and follow the steps to find the next available openings.    If you are unable to complete these [employer] scheduling steps, please call [phone] to schedule your testing.     Return to work/school/ guidance:  Please let your workplace manager and staffing office know when your quarantine ends     We can t give you an exact date as it depends on the above. You can calculate this on your own or work with your manager/staffing office to calculate this. (For example if you were exposed on 10/4, you would have to quarantine for 14 full days. That would be through 10/18. You could return on 10/19.)      If you receive a positive COVID-19 test result, follow the guidance of the those who are giving you the results. Usually the return to work is 10 (or in some cases 20 days from symptom onset.) If you work at [facility], you must also be cleared by Employee Occupational Health and Safety to return to work.        If you receive a negative COVID-19 test result and did not have a high risk exposure to someone with a known positive COVID-19 test, you can return to work once you're free of fever for 24 hours without fever-reducing medication and your symptoms are improving or resolved.      If you receive a negative COVID-19 test and If you had a high risk exposure to someone who has tested positive for COVID-19 then you can return to work 14 days after your last contact  with the positive individual    Note: If you have ongoing exposure to the covid positive person, this quarantine period may be more than 14 days. (For example, if you are continued to be exposed to your child who tested positive and cannot isolate from them, then the quarantine of 7-14 days can't start until your child is no longer contagious. This is typically 10 days from onset of the child's symptoms. So the total duration may be 17-24 days in this case.)    Sign up for [employer].   We know it's scary to hear that you might have COVID-19. We want to track your symptoms to make sure you're okay over the next 2 weeks. Please look for an email from [employer]--this is a free, online program that we'll use to keep in touch. To sign up, follow the link in the email you will receive. Learn more at [email or web address]    How can I take care of myself?    Get lots of rest. Drink extra fluids (unless a doctor has told you not to)    Take Tylenol (acetaminophen) or ibuprofen for fever or pain. If you have liver or kidney problems, ask your family doctor if it's okay to take Tylenol o ibuprofen    If you have other health problems (like cancer, heart failure, an organ transplant or severe kidney disease): Call your specialty clinic if you don't feel better in the next 2 days.    Know when to call 911. Emergency warning signs include:  o Trouble breathing or shortness of breath  o Pain or pressure in the chest that doesn't go away  o Feeling confused like you haven't felt before, or not being able to wake up  o Bluish-colored lips or face    Where can I get more information?  [facility] [place] - About COVID-19:   [email or web address]    CDC - What to Do If You're Sick:   www.cdc.gov/coronavirus/2019-ncov/about/steps-when-sick.html

## 2021-04-27 LAB
SARS-COV-2 RNA RESP QL NAA+PROBE: ABNORMAL
SPECIMEN SOURCE: ABNORMAL

## 2021-09-26 ENCOUNTER — HEALTH MAINTENANCE LETTER (OUTPATIENT)
Age: 36
End: 2021-09-26

## 2022-03-13 ENCOUNTER — HEALTH MAINTENANCE LETTER (OUTPATIENT)
Age: 37
End: 2022-03-13

## 2022-06-30 ENCOUNTER — OFFICE VISIT (OUTPATIENT)
Dept: URGENT CARE | Facility: URGENT CARE | Age: 37
End: 2022-06-30
Payer: COMMERCIAL

## 2022-06-30 VITALS
HEART RATE: 76 BPM | RESPIRATION RATE: 12 BRPM | OXYGEN SATURATION: 97 % | TEMPERATURE: 97.7 F | DIASTOLIC BLOOD PRESSURE: 85 MMHG | SYSTOLIC BLOOD PRESSURE: 129 MMHG

## 2022-06-30 DIAGNOSIS — S05.01XA ABRASION OF RIGHT CORNEA, INITIAL ENCOUNTER: Primary | ICD-10-CM

## 2022-06-30 PROCEDURE — 99213 OFFICE O/P EST LOW 20 MIN: CPT | Performed by: EMERGENCY MEDICINE

## 2022-06-30 RX ORDER — POLYMYXIN B SULFATE AND TRIMETHOPRIM 1; 10000 MG/ML; [USP'U]/ML
1-2 SOLUTION OPHTHALMIC EVERY 4 HOURS
Qty: 10 ML | Refills: 0 | Status: SHIPPED | OUTPATIENT
Start: 2022-06-30 | End: 2022-07-07

## 2022-06-30 NOTE — PROGRESS NOTES
"Assessment & Plan     Diagnosis:    (S05.01XA) Abrasion of right cornea, initial encounter  (primary encounter diagnosis)  Plan: trimethoprim-polymyxin b (POLYTRIM) 90869-6.1         UNIT/ML-% ophthalmic solution      Medical Decision Making:  Rex Cummins is a 37 year old male with right eye pain. Differential diagnosis for eye pain including conjunctivitis, foreign body, corneal abrasion, acute glaucoma, orbital or periorbital cellulitis, migraine, temporal arteritis, uveitis, or other intracranial abnormality. History, physical exam, Wood's lamp and Bi-focal exam do not support any of the above diagnoses with the exception of corneal abrasion, with an obvious small abrasion as noted below. No sign of globe rupture or sign of foreign body in the inverted eyelids or anywhere on the eye. Vision is intact; he has no complaints of vision changes. He was given a prescription for antibiotic eyedrops and instructions to follow up with PCP and ophthalmology.  Patient voices understanding and agreement with the plan.  All questions answered.      Benjamni Murdock PA-C  [facility]    Subjective      who presents to clinic today for the following health issues:  Chief Complaint   Patient presents with     Eye Problem     Got something in his right eye this morning   Tried irrigating it       HPI    Eye Problem    Onset of symptoms was this morning  Location: right eye   Course of illness is same.    Severity moderate  Current and Associated symptoms: pain, redness, possible foreign body  Treatment measures tried include flushed with water; felt he may have gotten something out of his eye that flew in while driving with the window down this morning.    Patient describes the symptoms as \"burning pain in my eye.\"     Patient denies any vision changes, headache, blood or purulent discharge from the eye.     Review of Systems    See HPI    Objective      Vitals: /85   Pulse 76   Temp 97.7  F (36.5  C) " (Oral)   Resp 12   SpO2 97%     Patient Vitals for the past 24 hrs:   BP Temp Temp src Pulse Resp SpO2   06/30/22 1141 129/85 97.7  F (36.5  C) Oral 76 12 97 %       Vital signs reviewed by: Benjamin Murdock PA-C    Physical Exam   Constitutional: Patient is alert and cooperative. Mild acute distress.  Eyes: Conjunctivae is injected on the right; upper lid slightly swollen.  Jones lamp and bifocal exam show corneal abrasion at 6 o'clock position; no foreign body, ulcer or dendritic lesion.  Negative Abimael sign. EOMI and are normal. PERRL.  Neurological: Alert and oriented x3. CN 2-7 intact.  Skin: No rash noted on visualized skin.  Psychiatric:The patient has a normal mood and affect.       Benjamin Murdock PA-C, June 30, 2022

## 2023-04-23 ENCOUNTER — HEALTH MAINTENANCE LETTER (OUTPATIENT)
Age: 38
End: 2023-04-23

## 2024-06-30 ENCOUNTER — HEALTH MAINTENANCE LETTER (OUTPATIENT)
Age: 39
End: 2024-06-30

## 2025-03-18 ENCOUNTER — OFFICE VISIT (OUTPATIENT)
Dept: FAMILY MEDICINE | Facility: CLINIC | Age: 40
End: 2025-03-18
Payer: COMMERCIAL

## 2025-03-18 VITALS
SYSTOLIC BLOOD PRESSURE: 123 MMHG | HEIGHT: 74 IN | DIASTOLIC BLOOD PRESSURE: 78 MMHG | WEIGHT: 213 LBS | TEMPERATURE: 97.7 F | HEART RATE: 86 BPM | RESPIRATION RATE: 20 BRPM | OXYGEN SATURATION: 99 % | BODY MASS INDEX: 27.34 KG/M2

## 2025-03-18 DIAGNOSIS — H60.391 INFECTIVE OTITIS EXTERNA, RIGHT: Primary | ICD-10-CM

## 2025-03-18 DIAGNOSIS — R59.0 LAD (LYMPHADENOPATHY) OF RIGHT CERVICAL REGION: ICD-10-CM

## 2025-03-18 PROCEDURE — 3074F SYST BP LT 130 MM HG: CPT | Performed by: PHYSICIAN ASSISTANT

## 2025-03-18 PROCEDURE — 99203 OFFICE O/P NEW LOW 30 MIN: CPT | Performed by: PHYSICIAN ASSISTANT

## 2025-03-18 PROCEDURE — 3078F DIAST BP <80 MM HG: CPT | Performed by: PHYSICIAN ASSISTANT

## 2025-03-18 PROCEDURE — 1125F AMNT PAIN NOTED PAIN PRSNT: CPT | Performed by: PHYSICIAN ASSISTANT

## 2025-03-18 RX ORDER — CIPROFLOXACIN AND DEXAMETHASONE 3; 1 MG/ML; MG/ML
4 SUSPENSION/ DROPS AURICULAR (OTIC) 2 TIMES DAILY
Qty: 7.5 ML | Refills: 0 | Status: SHIPPED | OUTPATIENT
Start: 2025-03-18

## 2025-03-18 ASSESSMENT — PAIN SCALES - GENERAL: PAINLEVEL_OUTOF10: MILD PAIN (3)

## 2025-03-18 NOTE — PROGRESS NOTES
"  Assessment & Plan     Infective otitis externa, right  Treat for a week. If symptoms persist, return for further evaluation. If symptoms worsen, come back sooner. Possibly Shingles. Will treat for otitis externa.  - ciprofloxacin-dexAMETHasone (CIPRODEX) 0.3-0.1 % otic suspension; Place 4 drops into the right ear 2 times daily.    LAD (lymphadenopathy) of right cervical region  One enlarged lymph node on the posterior chain of the right cervical region, mobile and tender, approximately 2cm.           BMI  Estimated body mass index is 27.35 kg/m  as calculated from the following:    Height as of this encounter: 1.88 m (6' 2\").    Weight as of this encounter: 96.6 kg (213 lb).       Jaquan Aldana is a 40 year old, presenting for the following health issues:  Otalgia (Right, one week, with drainage and pressure)      3/18/2025     2:12 PM   Additional Questions   Roomed by mary   Accompanied by self         3/18/2025     2:12 PM   Patient Reported Additional Medications   Patient reports taking the following new medications none     Jovan is a very pleasant 40 year old male who presents to clinic for evaluation of left ear pain, discharge for the past week. He originally thought he scratched himself but then noted drainage. He wears ear buds at work. There are times when he gets a sharp shooting pain in the ear and it can sometimes radiate into the throat. He has one very swollen lymph node on the right posterior neck. Left is fine. No fevers, chills, nausea, vomiting.     History of Present Illness       Reason for visit:  Ear pain and drainage  Symptom onset:  3-7 days ago   He is taking medications regularly.          Review of Systems  Constitutional, HEENT, cardiovascular, pulmonary, gi and gu systems are negative, except as otherwise noted.        Objective    /78   Pulse 86   Temp 97.7  F (36.5  C) (Tympanic)   Resp 20   Ht 1.88 m (6' 2\")   Wt 96.6 kg (213 lb)   SpO2 99%   BMI 27.35 kg/m  "   Body mass index is 27.35 kg/m .  Physical Exam  Vitals and nursing note reviewed.   Constitutional:       Appearance: Normal appearance. He is not ill-appearing or toxic-appearing.   HENT:      Right Ear: Tympanic membrane normal.      Left Ear: Tympanic membrane and ear canal normal.      Ears:        Comments: Inside the canal is some milk redness on the right side. On the posterior aspect, there is some thick, yellowish discharge. On the anterior superior aspect, there is a very tiny white head papular lesion.   Pulmonary:      Effort: Pulmonary effort is normal.   Skin:     Comments: Has some follicular, erythematous facial lesions on the right side of his beard and some papular erythematous scattered lesions at the base of the occiput bilaterally. Pt has been working with dermatology.    Neurological:      General: No focal deficit present.      Mental Status: He is alert and oriented to person, place, and time.   Psychiatric:         Mood and Affect: Mood normal.         Behavior: Behavior normal.         Thought Content: Thought content normal.         Judgment: Judgment normal.             Signed Electronically by: EZEKIEL PELAEZ PA-C

## 2025-03-19 ENCOUNTER — MYC MEDICAL ADVICE (OUTPATIENT)
Dept: FAMILY MEDICINE | Facility: CLINIC | Age: 40
End: 2025-03-19
Payer: COMMERCIAL

## 2025-03-19 NOTE — TELEPHONE ENCOUNTER
See PAIEON message and attached picture below.  Pt was seen in office and evaluated by provider yesterday. Provider noted:  Skin:     Comments: Has some follicular, erythematous facial lesions on the right side of his beard and some papular erythematous scattered lesions at the base of the occiput bilaterally. Pt has been working with dermatology.     Patient noted the bumps seem to be worsening, itching, painful and bigger. Can't see dermatology till next week.  Please advise on plan.

## 2025-03-20 ENCOUNTER — VIRTUAL VISIT (OUTPATIENT)
Dept: FAMILY MEDICINE | Facility: CLINIC | Age: 40
End: 2025-03-20
Payer: COMMERCIAL

## 2025-03-20 DIAGNOSIS — R21 RASH: Primary | ICD-10-CM

## 2025-03-20 RX ORDER — ISOTRETINOIN 20 MG/1
20 CAPSULE ORAL
COMMUNITY

## 2025-03-20 RX ORDER — SILDENAFIL 100 MG/1
100 TABLET, FILM COATED ORAL DAILY
COMMUNITY
Start: 2024-06-28 | End: 2025-06-28

## 2025-03-20 RX ORDER — VALACYCLOVIR HYDROCHLORIDE 1 G/1
1000 TABLET, FILM COATED ORAL 3 TIMES DAILY
Qty: 30 TABLET | Refills: 0 | Status: SHIPPED | OUTPATIENT
Start: 2025-03-20 | End: 2025-03-30

## 2025-03-20 NOTE — PROGRESS NOTES
Jovan is a 40 year old who is being evaluated via a billable video visit.    How would you like to obtain your AVS? MyChart  If the video visit is dropped, the invitation should be resent by: Text to cell phone: [phone]  Will anyone else be joining your video visit? No      Assessment & Plan     Rash  Patient presents for new onset painful rash on neck below left ear and left jaw line. Suspect shingles given dermatomal distribution and pain description.   Recommend the following:  Treatment with antiviral course Valacyclovir, refer to orders  Reviewed eye complication to monitor for development  Discussed return and ED precautions  Follow-up as needed  After shared decision making, patient agreeable with plan   - REVIEW OF HEALTH MAINTENANCE PROTOCOL ORDERS  - valACYclovir (VALTREX) 1000 mg tablet; Take 1 tablet (1,000 mg) by mouth 3 times daily for 10 days. If rash resolves prior to 7 days then okay to complete only 7 days of treatment.      Lang Reed MD  Family Medicine   [facility]  [phone]       Subjective   Jovan is a 40 year old, presenting for the following health issues:  Shingles (/)      3/20/2025    10:40 AM   Additional Questions   Roomed by karyna smith ma   Accompanied by self         3/20/2025    10:40 AM   Patient Reported Additional Medications   Patient reports taking the following new medications none     HPI    #Rash    Patient was in [place] office due to rash, and patient states that rash has turned into shingles per the provider message and it is getting worse, from neck to jaw now.   He states that it is bumpy and painful  He also states that if he moves his hair around, it is painful.   He has had ear pain as well, but ear is feeling better due to drops prescribed.  Denies any rash close to eye  Has never had shingles infection before      Review of Systems: Please refer to HPI for pertinent positives and negatives.          Objective       Vitals:  No vitals were  obtained today due to virtual visit.    Physical Exam   GENERAL: alert and no distress  EYES: Eyes grossly normal to inspection.  No discharge or erythema, or obvious scleral/conjunctival abnormalities.  RESP: No audible wheeze, cough, or visible cyanosis.    SKIN: Rash noted on neck below left ear and left jaw line  Refer to photo below.  NEURO: Cranial nerves grossly intact.  Mentation and speech appropriate for age.  PSYCH: Appropriate affect, tone, and pace of words    Photo taken by patient yesterday 3/19/2025:          Video-Visit Details  Type of service:  Video Visit   Originating Location (pt. Location): Home    Distant Location (provider location):  On-site  Platform used for Video Visit: Beny  Signed Electronically by: Lang Reed MD

## 2025-03-20 NOTE — PATIENT INSTRUCTIONS
Jovan,    Thank you for allowing [facility] to manage your care today.    Our plan is as follows:    I sent your prescriptions to your pharmacy, follow instructions as discussed and written on bottle.     Refer to attachments to review symptoms to watch for specifically regarding involvement of the eye.     If you have any questions or concerns, please use [employer] message and/or feel free to call us at [phone].    Your partner in health,    Dr. Reed      Did you know?    You can schedule a video visit for follow-up appointments as well as future appointments for certain conditions. Please see the below link.     [email or web address]    If you have not already done so, I encourage you to sign up for [employer] ([email or web address]). This will allow you to review your results, securely communicate with a provider, and schedule virtual visits as well.

## 2025-04-15 ENCOUNTER — MYC MEDICAL ADVICE (OUTPATIENT)
Dept: FAMILY MEDICINE | Facility: CLINIC | Age: 40
End: 2025-04-15
Payer: COMMERCIAL

## 2025-04-15 NOTE — TELEPHONE ENCOUNTER
Patient was seen a month ago. I was not the one who diagnosed shingles and he opted to do a virtual visit rather than be seen in person. That provider gave him valtrex. Since he did not follow up with me, I cannot address this without an  in-person visit to evaluate. Please schedule into an appropriate visit. Thank you.    Ann

## 2025-04-17 ENCOUNTER — OFFICE VISIT (OUTPATIENT)
Dept: FAMILY MEDICINE | Facility: CLINIC | Age: 40
End: 2025-04-17
Payer: COMMERCIAL

## 2025-04-17 VITALS
OXYGEN SATURATION: 100 % | DIASTOLIC BLOOD PRESSURE: 87 MMHG | BODY MASS INDEX: 27.34 KG/M2 | WEIGHT: 213 LBS | RESPIRATION RATE: 20 BRPM | HEART RATE: 70 BPM | TEMPERATURE: 97.8 F | SYSTOLIC BLOOD PRESSURE: 139 MMHG | HEIGHT: 74 IN

## 2025-04-17 DIAGNOSIS — Z13.1 SCREENING FOR DIABETES MELLITUS: Primary | ICD-10-CM

## 2025-04-17 DIAGNOSIS — H60.393 INFECTIVE OTITIS EXTERNA, BILATERAL: Primary | ICD-10-CM

## 2025-04-17 DIAGNOSIS — H60.393 INFECTIVE OTITIS EXTERNA, BILATERAL: ICD-10-CM

## 2025-04-17 DIAGNOSIS — Z11.59 NEED FOR HEPATITIS C SCREENING TEST: ICD-10-CM

## 2025-04-17 DIAGNOSIS — L98.9 FACIAL SKIN LESION: ICD-10-CM

## 2025-04-17 PROCEDURE — 99213 OFFICE O/P EST LOW 20 MIN: CPT | Performed by: PHYSICIAN ASSISTANT

## 2025-04-17 PROCEDURE — 1126F AMNT PAIN NOTED NONE PRSNT: CPT | Performed by: PHYSICIAN ASSISTANT

## 2025-04-17 PROCEDURE — 3075F SYST BP GE 130 - 139MM HG: CPT | Performed by: PHYSICIAN ASSISTANT

## 2025-04-17 PROCEDURE — 3079F DIAST BP 80-89 MM HG: CPT | Performed by: PHYSICIAN ASSISTANT

## 2025-04-17 RX ORDER — NEOMYCIN SULFATE, POLYMYXIN B SULFATE, HYDROCORTISONE 3.5; 10000; 1 MG/ML; [USP'U]/ML; MG/ML
3 SOLUTION/ DROPS AURICULAR (OTIC) 4 TIMES DAILY
Qty: 10 ML | Refills: 0 | Status: SHIPPED | OUTPATIENT
Start: 2025-04-17

## 2025-04-17 ASSESSMENT — PAIN SCALES - GENERAL: PAINLEVEL_OUTOF10: NO PAIN (0)

## 2025-04-17 NOTE — PROGRESS NOTES
{PROVIDER CHARTING PREFERENCE:758650}    Jaquan Aldnaa is a 40 year old, presenting for the following health issues:  Ear Problem (Bilateral, possible shingles in both)      4/17/2025     9:16 AM   Additional Questions   Roomed by mary   Accompanied by self         4/17/2025     9:16 AM   Patient Reported Additional Medications   Patient reports taking the following new medications see chart     Ear Problem    History of Present Illness       Reason for visit:  Shingles    He eats 2-3 servings of fruits and vegetables daily.He consumes 1 sweetened beverage(s) daily.He exercises with enough effort to increase his heart rate 20 to 29 minutes per day.  He exercises with enough effort to increase his heart rate 4 days per week.   He is taking medications regularly.        {MA/LPN/RN Pre-Provider Visit Orders- hCG/UA/Strep (Optional):559132}  {SUPERLIST (Optional):933503}  {additonal problems for provider to add (Optional):101583}    {ROS Picklists (Optional):584422}      Objective    /87   Pulse 70   Temp 97.8  F (36.6  C) (Oral)   Resp 20   SpO2 100%   There is no height or weight on file to calculate BMI.  Physical Exam   {Exam List (Optional):131744}    {Diagnostic Test Results (Optional):543845}        Signed Electronically by: EZEKIEL PELAEZ PA-C  {Email feedback regarding this note to [email or web address]   :394519}   precautions for possible ER evaluation. He states that the rash on the side of the face is not painful, but the rash never resolved. In addition, the ear issue has worsened and is now bilateral. He says there is liquid discharge from both ears and it is sore. He admits to using ear buds often but uses isopropyl alcohol to clean them after every use. No fevers, chills, cold symptoms or other concerns today.     As an aside, records note that Jovan was on Accutane in the past for acne.     History of Present Illness       Reason for visit:  Shingles    He eats 2-3 servings of fruits and vegetables daily.He consumes 1 sweetened beverage(s) daily.He exercises with enough effort to increase his heart rate 20 to 29 minutes per day.  He exercises with enough effort to increase his heart rate 4 days per week.   He is taking medications regularly.       Review of Systems  Constitutional, HEENT, cardiovascular, pulmonary, gi and gu systems are negative, except as otherwise noted.      Objective    /87   Pulse 70   Temp 97.8  F (36.6  C) (Oral)   Resp 20   SpO2 100%   There is no height or weight on file to calculate BMI.  Physical Exam  Vitals and nursing note reviewed.   Constitutional:       Appearance: Normal appearance. He is not ill-appearing or toxic-appearing.   HENT:      Right Ear: Tympanic membrane normal.      Left Ear: Tympanic membrane normal.      Ears:      Comments: There is sloughing skin in both canals and some erythema of the superior aspect of both canals (suspiciously correlative to pressure from ear buds). I see no discrete discharge. No vesicular lesions noted.   Neck:        Comments: Discrete papular lesions, erythematous, not vesicular. Still has some mild lymphadenopathy on the right neck.  Pulmonary:      Effort: Pulmonary effort is normal.   Neurological:      General: No focal deficit present.      Mental Status: He is alert and oriented to person, place, and time.   Psychiatric:          Mood and Affect: Mood normal.         Behavior: Behavior normal.         Thought Content: Thought content normal.         Judgment: Judgment normal.              Signed Electronically by: EZEKIEL PELAEZ PA-C

## 2025-05-31 ENCOUNTER — OFFICE VISIT (OUTPATIENT)
Dept: URGENT CARE | Facility: URGENT CARE | Age: 40
End: 2025-05-31
Payer: COMMERCIAL

## 2025-05-31 VITALS
OXYGEN SATURATION: 98 % | HEIGHT: 74 IN | HEART RATE: 89 BPM | SYSTOLIC BLOOD PRESSURE: 111 MMHG | WEIGHT: 217 LBS | TEMPERATURE: 98.1 F | BODY MASS INDEX: 27.85 KG/M2 | RESPIRATION RATE: 16 BRPM | DIASTOLIC BLOOD PRESSURE: 74 MMHG

## 2025-05-31 DIAGNOSIS — H66.001 NON-RECURRENT ACUTE SUPPURATIVE OTITIS MEDIA OF RIGHT EAR WITHOUT SPONTANEOUS RUPTURE OF TYMPANIC MEMBRANE: ICD-10-CM

## 2025-05-31 DIAGNOSIS — H60.393 INFECTIVE OTITIS EXTERNA OF BOTH EARS: Primary | ICD-10-CM

## 2025-05-31 DIAGNOSIS — H65.03 NON-RECURRENT ACUTE SEROUS OTITIS MEDIA OF BOTH EARS: ICD-10-CM

## 2025-05-31 PROCEDURE — 3074F SYST BP LT 130 MM HG: CPT | Performed by: FAMILY MEDICINE

## 2025-05-31 PROCEDURE — 3078F DIAST BP <80 MM HG: CPT | Performed by: FAMILY MEDICINE

## 2025-05-31 PROCEDURE — 99214 OFFICE O/P EST MOD 30 MIN: CPT | Performed by: FAMILY MEDICINE

## 2025-05-31 RX ORDER — AZITHROMYCIN 250 MG/1
TABLET, FILM COATED ORAL
Qty: 6 TABLET | Refills: 0 | Status: SHIPPED | OUTPATIENT
Start: 2025-05-31 | End: 2025-06-05

## 2025-05-31 RX ORDER — FLUTICASONE PROPIONATE 50 MCG
1 SPRAY, SUSPENSION (ML) NASAL 2 TIMES DAILY
Qty: 16 G | Refills: 0 | Status: SHIPPED | OUTPATIENT
Start: 2025-05-31

## 2025-05-31 RX ORDER — CIPROFLOXACIN AND DEXAMETHASONE 3; 1 MG/ML; MG/ML
4 SUSPENSION/ DROPS AURICULAR (OTIC) 2 TIMES DAILY
Qty: 7.5 ML | Refills: 0 | Status: SHIPPED | OUTPATIENT
Start: 2025-05-31 | End: 2025-06-07

## 2025-05-31 NOTE — PROGRESS NOTES
Urgent Care Clinic Visit    Chief Complaint   Patient presents with    Ear Problem               5/31/2025     3:54 PM   Additional Questions   Roomed by Chan

## 2025-05-31 NOTE — PROGRESS NOTES
ASSESSMENT/PLAN:      ICD-10-CM    1. Infective otitis externa of both ears  H60.393 ciprofloxacin-dexAMETHasone (CIPRODEX) 0.3-0.1 % otic suspension      2. Non-recurrent acute suppurative otitis media of right ear without spontaneous rupture of tympanic membrane  H66.001 azithromycin (ZITHROMAX) 250 MG tablet      3. Non-recurrent acute serous otitis media of both ears  H65.03 fluticasone (FLONASE) 50 MCG/ACT nasal spray          Patient Instructions   Start Zithromax z-aleyda for right ear infection     Start Ciprodex 4 drops in both ears 2 times a day for 7 days    If ears are itchy in the future, use over the counter hydrocortisone cream inside the ear canal up to 2 times as needed, no more than 2 weeks      For fluid behind the eardrums    Start Flonase1 spray in each nostril in am and bedtime for 14 days for fluid behind the ear drums    Start Mucinex --generic ( guaifenesin) 1200 mg once a day for fluid behind the ear drums for 14 days        to ER if symptoms worsen     Follow up with your primary care provider or clinic in about 2-3 days  if your symptoms do not improve          Reviewed medication instructions and side effects. Follow up if experiences side effects.     I reviewed supportive care, otc meds to use if needed, expected course, and signs of concern.  Follow up as needed or if he does not improve within  1-2 days or if worsens in any way.  Reviewed red flag symptoms and is to go to the ER if experiences any of these.     The use of Dragon/[employer] dictation services may have been used to construct the content in this note; any grammatical or spelling errors are non-intentional. Please contact the author of this note directly if you are in need of any clarification.                Patient presents with:  Ear Problem       Subjective     Rex Cummins is a 40 year old male who presents to clinic today for the following health issues:    HPI   Patient with onset bilateral ear pain -ear canals   tender thin drainage in the last few days  History of otitis externa for 4/17/25  treated with Cortisporin drops with relief and then symptoms returned completed 7 days ago Ciprodex drops on 4/24/25 and symptoms resolved    No fever or chills, no congestion cough/URI symptoms      Past Medical History:   Diagnosis Date    Ganglion cyst     Tobacco use disorder, continuous      Social History     Tobacco Use    Smoking status: Former     Types: Cigarettes, Dip, chew, snus or snuff     Passive exposure: Past    Smokeless tobacco: Never   Substance Use Topics    Alcohol use: Yes     Comment: Cutting down - socially, a few drinks here and there        Current Outpatient Medications   Medication Sig Dispense Refill    azithromycin (ZITHROMAX) 250 MG tablet Take 2 tablets (500 mg) by mouth daily for 1 day, THEN 1 tablet (250 mg) daily for 4 days. 6 tablet 0    ciprofloxacin-dexAMETHasone (CIPRODEX) 0.3-0.1 % otic suspension Place 4 drops into both ears 2 times daily for 7 days. 7.5 mL 0    fluticasone (FLONASE) 50 MCG/ACT nasal spray Spray 1 spray into both nostrils 2 times daily. 16 g 0    ISOtretinoin (ACCUTANE) 20 MG capsule Take 20 mg by mouth.      neomycin-polymyxin-hydrocortisone (CORTISPORIN) 3.5-08201-2 otic solution Place 3 drops into both ears 4 times daily. 10 mL 0    sildenafil (VIAGRA) 100 MG tablet Take 100 mg by mouth daily.      ciprofloxacin-dexAMETHasone (CIPRODEX) 0.3-0.1 % otic suspension Place 4 drops into the right ear 2 times daily. (Patient not taking: Reported on 5/31/2025) 7.5 mL 0    valACYclovir (VALTREX) 1000 mg tablet Take 1 tablet (1,000 mg) by mouth 3 times daily for 10 days. If rash resolves prior to 7 days then okay to complete only 7 days of treatment. (Patient not taking: Reported on 5/31/2025) 30 tablet 0     Allergies   Allergen Reactions    Penicillins Swelling     Eyes swelled shut    Swelling of eyes             ROS are negative, except as otherwise noted HPI      Objective    BP  "111/74 (BP Location: Right arm, Patient Position: Sitting, Cuff Size: Adult Large)   Pulse 89   Temp 98.1  F (36.7  C) (Oral)   Resp 16   Ht 1.88 m (6' 2\")   Wt 98.4 kg (217 lb)   SpO2 98%   BMI 27.86 kg/m    Body mass index is 27.86 kg/m .  Physical Exam   GENERAL: alert and no distress  EYES: Eyes grossly normal to inspection, PERRL and conjunctivae and sclerae normal  HENT: Ears-ear canals mild erythema whitie/gray drainage tender bilateral   TM's-right TM erythematous opacified, left TM dull air-fluid level, nose mild congestion and mouth without ulcers or lesions, posterior pharynx mild diffuse pharyngeal erythema  NECK: no adenopathy, no asymmetry, masses, or scars  NEURO: Normal strength and tone, mentation intact and speech normal, normal gait       Diagnostic Test Results:  Labs reviewed in Epic  No results found for any visits on 05/31/25.                  "

## 2025-05-31 NOTE — PATIENT INSTRUCTIONS
Start Zithromax z-aleyda for right ear infection     Start Ciprodex 4 drops in both ears 2 times a day for 7 days    If ears are itchy in the future, use over the counter hydrocortisone cream inside the ear canal up to 2 times as needed, no more than 2 weeks      For fluid behind the eardrums    Start Flonase1 spray in each nostril in am and bedtime for 14 days for fluid behind the ear drums    Start Mucinex --generic ( guaifenesin) 1200 mg once a day for fluid behind the ear drums for 14 days        to ER if symptoms worsen     Follow up with your primary care provider or clinic in about 2-3 days  if your symptoms do not improve

## 2025-07-13 ENCOUNTER — HEALTH MAINTENANCE LETTER (OUTPATIENT)
Age: 40
End: 2025-07-13